# Patient Record
Sex: MALE | Race: BLACK OR AFRICAN AMERICAN | Employment: UNEMPLOYED | URBAN - METROPOLITAN AREA
[De-identification: names, ages, dates, MRNs, and addresses within clinical notes are randomized per-mention and may not be internally consistent; named-entity substitution may affect disease eponyms.]

---

## 2019-01-03 ENCOUNTER — HOSPITAL ENCOUNTER (EMERGENCY)
Age: 44
Discharge: HOME | End: 2019-01-03
Attending: EMERGENCY MEDICINE
Payer: MEDICAID

## 2019-01-03 VITALS
TEMPERATURE: 98.1 F | DIASTOLIC BLOOD PRESSURE: 92 MMHG | HEART RATE: 98 BPM | RESPIRATION RATE: 17 BRPM | SYSTOLIC BLOOD PRESSURE: 133 MMHG | OXYGEN SATURATION: 100 %

## 2019-01-03 DIAGNOSIS — J06.9 ACUTE UPPER RESPIRATORY INFECTION: Primary | ICD-10-CM

## 2019-01-03 PROCEDURE — 99282 EMERGENCY DEPT VISIT SF MDM: CPT

## 2019-01-03 RX ORDER — PSEUDOEPHEDRINE HYDROCHLORIDE 60 MG/1
60 TABLET ORAL
Qty: 20 TAB | Refills: 1 | Status: SHIPPED | OUTPATIENT
Start: 2019-01-03 | End: 2019-01-08

## 2019-01-03 NOTE — ED NOTES
Discharge instructions, prescriptions, and work note given to pt - verbalized understanding. Pt ambulated independently out of ED.

## 2019-01-03 NOTE — ED PROVIDER NOTES
EMERGENCY DEPARTMENT HISTORY AND PHYSICAL EXAM 
 
Date: 1/3/2019 Patient Name: Macie Contreras History of Presenting Illness Chief Complaint Patient presents with  Cough  Nausea  Chest Congestion History Provided By: Patient Chief Complaint: congestion, cough, nausea Duration: 3 Days Timing:  Acute Location: chest, sinuses Quality: Dull and Pressure Severity: Moderate Modifying Factors: smokes Associated Symptoms: denies any other associated signs or symptoms Additional History (Context): Macie Contreras is a 41 y.o. male with tobacco use who presents with chest and nasal congestion, cough x 3d. Was around family children over new years and they are ill. Denies fever, vomiting, wheezing, SOB. PCP: Jenny Wagoner MD (Inactive) Current Outpatient Medications Medication Sig Dispense Refill  pseudoephedrine (SUDAFED) 60 mg tablet Take 1 Tab by mouth every six (6) hours as needed for Congestion for up to 5 days. 20 Tab 1  
 Camphor-Eucalyptus Oil-Menthol (VICKS VAPORUB) 4.8-1.2-2.6 % oint 1 Actuation(s) by Apply Externally route three (3) times daily as needed. 50 g 0  
 dextromethorphan-guaiFENesin (ROBITUSSIN-DM)  mg/5 mL syrup Take 10 mL by mouth every six (6) hours as needed for Cough. 240 mL 0  
 albuterol (PROVENTIL HFA, VENTOLIN HFA, PROAIR HFA) 90 mcg/actuation inhaler Take 2 Puffs by inhalation every four (4) hours as needed for Wheezing. Past History Past Medical History: 
Past Medical History:  
Diagnosis Date  Glaucoma Past Surgical History: 
Past Surgical History:  
Procedure Laterality Date  HX HEENT    
 glaucoma left eye Family History: No family history on file. Social History: 
Social History Tobacco Use  Smoking status: Never Smoker Substance Use Topics  Alcohol use: Yes  Drug use: No  
 
 
Allergies: 
No Known Allergies Review of Systems Review of Systems HENT: Positive for congestion and postnasal drip. Negative for drooling, sore throat and trouble swallowing. Respiratory: Positive for cough. Negative for shortness of breath and wheezing. Gastrointestinal: Positive for nausea. All other systems reviewed and are negative. All Other Systems Negative Physical Exam  
 
Vitals:  
 01/03/19 1456 BP: (!) 133/92 Pulse: 98 Resp: 17 Temp: 98.1 °F (36.7 °C) SpO2: 100% Physical Exam  
Constitutional: Vital signs are normal. He appears well-developed and well-nourished. He is active. Non-toxic appearance. He does not appear ill. No distress. HENT:  
Head: Normocephalic and atraumatic. Neck: Normal range of motion. Neck supple. Carotid bruit is not present. No tracheal deviation present. No thyromegaly present. Cardiovascular: Normal rate, regular rhythm and normal heart sounds. Exam reveals no gallop and no friction rub. No murmur heard. Pulmonary/Chest: Effort normal and breath sounds normal. No stridor. No respiratory distress. He has no wheezes. He has no rales. He exhibits no tenderness. Abdominal: Soft. He exhibits no distension and no mass. There is no tenderness. There is no rebound, no guarding and no CVA tenderness. Musculoskeletal: Normal range of motion. Neurological: He is alert. Skin: Skin is warm, dry and intact. He is not diaphoretic. No pallor. Psychiatric: He has a normal mood and affect. His speech is normal and behavior is normal. Judgment and thought content normal.  
Nursing note and vitals reviewed. Diagnostic Study Results Labs - No results found for this or any previous visit (from the past 12 hour(s)). Radiologic Studies - No orders to display CT Results  (Last 48 hours) None CXR Results  (Last 48 hours) None Medical Decision Making I am the first provider for this patient.  
 
I reviewed the vital signs, available nursing notes, past medical history, past surgical history, family history and social history. Vital Signs-Reviewed the patient's vital signs. Records Reviewed: Nursing Notes Procedures: 
Procedures Provider Notes (Medical Decision Making): treat URI. MED RECONCILIATION: 
No current facility-administered medications for this encounter. Current Outpatient Medications Medication Sig  pseudoephedrine (SUDAFED) 60 mg tablet Take 1 Tab by mouth every six (6) hours as needed for Congestion for up to 5 days.  Camphor-Eucalyptus Oil-Menthol (VICKS VAPORUB) 4.8-1.2-2.6 % oint 1 Actuation(s) by Apply Externally route three (3) times daily as needed.  dextromethorphan-guaiFENesin (ROBITUSSIN-DM)  mg/5 mL syrup Take 10 mL by mouth every six (6) hours as needed for Cough.  albuterol (PROVENTIL HFA, VENTOLIN HFA, PROAIR HFA) 90 mcg/actuation inhaler Take 2 Puffs by inhalation every four (4) hours as needed for Wheezing. Disposition: 
home DISCHARGE NOTE:  
3:48 PM 
 
Pt has been reexamined. Patient has no new complaints, changes, or physical findings. Care plan outlined and precautions discussed. Results of exam were reviewed with the patient. All medications were reviewed with the patient; will d/c home with see below. All of pt's questions and concerns were addressed. Patient was instructed and agrees to follow up with PCP, as well as to return to the ED upon further deterioration. Patient is ready to go home. Follow-up Information Follow up With Specialties Details Why Contact Info Jenny Wagoner MD Internal Medicine Schedule an appointment as soon as possible for a visit in 3 days  [employer] [place] [address] 
[phone] [facility] EMERGENCY DEPT Emergency Medicine  If symptoms worsen return immediately [place] [address] Current Discharge Medication List  
  
START taking these medications Details pseudoephedrine (SUDAFED) 60 mg tablet Take 1 Tab by mouth every six (6) hours as needed for Congestion for up to 5 days. Qty: 20 Tab, Refills: 1 Camphor-Eucalyptus Oil-Menthol (VICKS VAPORUB) 4.8-1.2-2.6 % oint 1 Actuation(s) by Apply Externally route three (3) times daily as needed. Qty: 50 g, Refills: 0  
  
dextromethorphan-guaiFENesin (ROBITUSSIN-DM)  mg/5 mL syrup Take 10 mL by mouth every six (6) hours as needed for Cough. Qty: 240 mL, Refills: 0 Diagnosis Clinical Impression: 1. Acute upper respiratory infection

## 2019-01-03 NOTE — LETTER
[address] [place] [address] [facility] EMERGENCY DEPT 
[address] [facility] [address] 
[phone] Work/School Note Date: 1/3/2019 To Whom It May concern: 
 
Karli Raygoza was seen and treated today in the emergency room by the following provider(s): 
Attending Provider: Leonor Quintana MD 
Physician Assistant: SHANTELL Palacios. Karli Raygoza may return to work on 1-7-19.  
 
Sincerely, 
 
 
 
 
Savannah Ness RN

## 2019-01-03 NOTE — DISCHARGE INSTRUCTIONS
Patient Education        Upper Respiratory Infection (Cold): Care Instructions  Your Care Instructions    An upper respiratory infection, or URI, is an infection of the nose, sinuses, or throat. URIs are spread by coughs, sneezes, and direct contact. The common cold is the most frequent kind of URI. The flu and sinus infections are other kinds of URIs. Almost all URIs are caused by viruses. Antibiotics won't cure them. But you can treat most infections with home care. This may include drinking lots of fluids and taking over-the-counter pain medicine. You will probably feel better in 4 to 10 days. The doctor has checked you carefully, but problems can develop later. If you notice any problems or new symptoms, get medical treatment right away. Follow-up care is a key part of your treatment and safety. Be sure to make and go to all appointments, and call your doctor if you are having problems. It's also a good idea to know your test results and keep a list of the medicines you take. How can you care for yourself at home? · To prevent dehydration, drink plenty of fluids, enough so that your urine is light yellow or clear like water. Choose water and other caffeine-free clear liquids until you feel better. If you have kidney, heart, or liver disease and have to limit fluids, talk with your doctor before you increase the amount of fluids you drink. · Take an over-the-counter pain medicine, such as acetaminophen (Tylenol), ibuprofen (Advil, Motrin), or naproxen (Aleve). Read and follow all instructions on the label. · Before you use cough and cold medicines, check the label. These medicines may not be safe for young children or for people with certain health problems. · Be careful when taking over-the-counter cold or flu medicines and Tylenol at the same time. Many of these medicines have acetaminophen, which is Tylenol. Read the labels to make sure that you are not taking more than the recommended dose.  Too much acetaminophen (Tylenol) can be harmful. · Get plenty of rest.  · Do not smoke or allow others to smoke around you. If you need help quitting, talk to your doctor about stop-smoking programs and medicines. These can increase your chances of quitting for good. When should you call for help? Call 911 anytime you think you may need emergency care. For example, call if:    · You have severe trouble breathing.    Call your doctor now or seek immediate medical care if:    · You seem to be getting much sicker.     · You have new or worse trouble breathing.     · You have a new or higher fever.     · You have a new rash.    Watch closely for changes in your health, and be sure to contact your doctor if:    · You have a new symptom, such as a sore throat, an earache, or sinus pain.     · You cough more deeply or more often, especially if you notice more mucus or a change in the color of your mucus.     · You do not get better as expected. Where can you learn more? Go to http://sol-rashard.info/. Enter G083 in the search box to learn more about \"Upper Respiratory Infection (Cold): Care Instructions. \"  Current as of: December 6, 2017  Content Version: 11.8  © 2935-3313 [employer]. Care instructions adapted under license by [employer] (which disclaims liability or warranty for this information). If you have questions about a medical condition or this instruction, always ask your healthcare professional. [place] [address] any warranty or liability for your use of this information.

## 2019-01-03 NOTE — ED TRIAGE NOTES
Pt arrived c/o generally not feeling well, states he was around sick children, states he has had intermittent cough, chest congestion, also states he had intermittent nausea with no vomiting

## 2019-05-06 ENCOUNTER — APPOINTMENT (OUTPATIENT)
Dept: CT IMAGING | Age: 44
End: 2019-05-06
Attending: PHYSICIAN ASSISTANT
Payer: MEDICAID

## 2019-05-06 ENCOUNTER — HOSPITAL ENCOUNTER (EMERGENCY)
Age: 44
Discharge: HOME | End: 2019-05-06
Attending: EMERGENCY MEDICINE
Payer: MEDICAID

## 2019-05-06 VITALS
DIASTOLIC BLOOD PRESSURE: 85 MMHG | BODY MASS INDEX: 20.3 KG/M2 | SYSTOLIC BLOOD PRESSURE: 133 MMHG | TEMPERATURE: 99 F | OXYGEN SATURATION: 99 % | HEIGHT: 71 IN | HEART RATE: 87 BPM | WEIGHT: 145 LBS | RESPIRATION RATE: 18 BRPM
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
## 2019-05-06 DIAGNOSIS — R51.9 NONINTRACTABLE HEADACHE, UNSPECIFIED CHRONICITY PATTERN, UNSPECIFIED HEADACHE TYPE: ICD-10-CM

## 2019-05-06 DIAGNOSIS — M54.2 NECK PAIN: Primary | ICD-10-CM

## 2019-05-06 PROCEDURE — 99282 EMERGENCY DEPT VISIT SF MDM: CPT

## 2019-05-06 RX ORDER — IBUPROFEN 600 MG/1
600 TABLET ORAL
Qty: 20 TAB | Refills: 0 | Status: SHIPPED | OUTPATIENT
Start: 2019-05-06 | End: 2020-06-02

## 2019-05-06 RX ORDER — CYCLOBENZAPRINE HCL 5 MG
10 TABLET ORAL 3 TIMES DAILY
Qty: 18 TAB | Refills: 0 | Status: SHIPPED | OUTPATIENT
Start: 2019-05-06 | End: 2019-05-09
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
## 2019-07-18 ENCOUNTER — HOSPITAL ENCOUNTER (EMERGENCY)
Age: 44
Discharge: HOME | End: 2019-07-18
Attending: EMERGENCY MEDICINE
Payer: MEDICAID

## 2019-07-18 DIAGNOSIS — H00.012 HORDEOLUM EXTERNUM RIGHT LOWER EYELID: Primary | ICD-10-CM

## 2019-07-18 PROCEDURE — 99282 EMERGENCY DEPT VISIT SF MDM: CPT

## 2019-07-18 NOTE — DISCHARGE INSTRUCTIONS
Patient Education        Styes and Chalazia: Care Instructions  Your Care Instructions    Styes and chalazia (say \"[id number]\") are both conditions that can cause swelling of the eyelid. A stye is an infection in the root of an eyelash. The infection causes a tender red lump on the edge of the eyelid. The infection can spread until the whole eyelid becomes red and inflamed. Styes usually break open, and a tiny amount of pus drains. They usually clear up on their own in about a week, but they sometimes need treatment with antibiotics. A chalazion is a lump or cyst in the eyelid (chalazion is singular; chalazia is plural). It is caused by swelling and inflammation of deep oil glands inside the eyelid. Chalazia are usually not infected. They can take a few months to heal.  If a chalazion becomes more swollen and painful or does not go away, you may need to have it drained by your doctor. Follow-up care is a key part of your treatment and safety. Be sure to make and go to all appointments, and call your doctor if you are having problems. It's also a good idea to know your test results and keep a list of the medicines you take. How can you care for yourself at home? · Do not rub your eyes. Do not squeeze or try to open a stye or chalazion. · To help a stye or chalazion heal faster:  ? Put a warm, moist compress on your eye for 5 to 10 minutes, 3 to 6 times a day. Heat often brings a stye to a point where it drains on its own. Keep in mind that warm compresses will often increase swelling a little at first.  ? Do not use hot water or heat a wet cloth in a microwave oven. The compress may get too hot and can burn the eyelid. · Always wash your hands before and after you use a compress or touch your eyes. · If the doctor gave you antibiotic drops or ointment, use the medicine exactly as directed. Use the medicine for as long as instructed, even if your eye starts to feel better.   · To put in eyedrops or ointment:  ? Tilt your head back, and pull your lower eyelid down with one finger. ? Drop or squirt the medicine inside the lower lid. ? Close your eye for 30 to 60 seconds to let the drops or ointment move around. ? Do not touch the ointment or dropper tip to your eyelashes or any other surface. · Do not wear eye makeup or contact lenses until the stye or chalazion heals. · Do not share towels, pillows, or washcloths while you have a stye. When should you call for help? Call your doctor now or seek immediate medical care if:    · You have pain in your eye.     · You have a change in vision or loss of vision.     · Redness and swelling get much worse.    Watch closely for changes in your health, and be sure to contact your doctor if:    · Your stye does not get better in 1 week.     · Your chalazion does not start to get better after several weeks. Where can you learn more? Go to http://sol-rashard.info/. Enter E456 in the search box to learn more about \"Styes and Chalazia: Care Instructions. \"  Current as of: July 17, 2018  Content Version: 11.9  © 0703-6513 [employer]. Care instructions adapted under license by [employer] (which disclaims liability or warranty for this information). If you have questions about a medical condition or this instruction, always ask your healthcare professional. [place] [address] any warranty or liability for your use of this information.

## 2019-07-18 NOTE — ED PROVIDER NOTES
EMERGENCY DEPARTMENT HISTORY AND PHYSICAL EXAM      Date: 7/18/2019  Patient Name: Macho Mancini    History of Presenting Illness     Chief Complaint   Patient presents with    Eye Pain       History (Context): Macho Mancini is a 45 y.o. gentleman with glaucoma and history of styes who presents with acute onset, moderate, localized right lower lower eyelid swelling with mild tenderness without exacerbating/relieving features or other associated symptoms. On review of systems, the patient denies fever, chills, rashes, vision changes, foreign body sensation, conjunctival injection. PCP: Anuel Roberts MD (Inactive)    Current Outpatient Medications   Medication Sig Dispense Refill    ibuprofen (MOTRIN) 600 mg tablet Take 1 Tab by mouth every six (6) hours as needed for Pain. 20 Tab 0       Past History     Past Medical History:  Past Medical History:   Diagnosis Date    Glaucoma        Past Surgical History:  Past Surgical History:   Procedure Laterality Date    HX HEENT      glaucoma left eye       Family History:  History reviewed. No pertinent family history. Social History:  Social History     Tobacco Use    Smoking status: Current Every Day Smoker    Smokeless tobacco: Never Used   Substance Use Topics    Alcohol use: Yes    Drug use: No       Allergies:  No Known Allergies    PMH, PSH, family history, social history, allergies reviewed with the patient with significant items noted above. Review of Systems   As per HPI, otherwise reviewed and negative. Physical Exam   There were no vitals filed for this visit. Gen: Well-appearing, in no acute distress  HEENT: Normocephalic, sclera anicteric, small white Mass on the lower eyelid of the right eye, no rounding redness, mild surrounding swelling, no conjunctival injection, vision 20/20. Cardiovascular: Normal rate, regular rhythm, no murmurs, rubs, gallops. Pulses intact and equal distally. Pulmonary: No respiratory distress.   No stridor. Clear lungs. ABD: Soft, nontender, nondistended. Neuro: Alert. Normal speech. Normal mentation. Psych: Normal thought content and thought processes. : No CVA tenderness  EXT: Moves all extremities well. No cyanosis or clubbing. Skin: Warm and well-perfused. Diagnostic Study Results     Labs -   No results found for this or any previous visit (from the past 12 hour(s)). Radiologic Studies -   No orders to display     CT Results  (Last 48 hours)    None        CXR Results  (Last 48 hours)    None            Medical Decision Making   I am the first provider for this patient. I reviewed the vital signs, available nursing notes, past medical history, past surgical history, family history and social history. Vital Signs-Reviewed the patient's vital signs. Records Reviewed: Personally, on initial evaluation    MDM:   Patient presents with swelling of the lower eyelid. Exam significant for swelling of the lower. DDX considered: Hordeolum externa  DDX thought to be less likely but also considered due to high risk condition: Chalazion, cellulitis, angioedema    Patient condition on initial evaluation: Stable    Plan:   Discharge with instructions for warm compresses    Orders as below:  No orders of the defined types were placed in this encounter. Patient condition at time of disposition: Stable  DISCHARGE NOTE:    Care plan outlined and precautions discussed. All of pt's questions and concerns were addressed. Alarm symptoms and return precautions associated with chief complaint and evaluation were reviewed with the patient in detail. The patient demonstrated adequate understanding. Patient was instructed and agrees to follow up with PCP and ophthalmologist, as well as to return to the ED upon further deterioration. Patient is ready to go home.   The patient is very happy with this plan    Follow-up Information     Follow up With Specialties Details Why Contact Info Victoria Chávez MD Internal Medicine  As needed, If symptoms worsen [address] EMERGENCY DEPT Emergency Medicine  As needed, If symptoms worsen [address] [facility] [address]  [phone]              Diagnosis     Clinical Impression:   1. Hordeolum externum right lower eyelid        Signed,  Clinton Galicia MD  Emergency Physician  [facility]    As a voice dictation software was utilized to dictate this note, minor word transpositions can occur. I apologize for confusing wording and typographic errors. Please feel free to contact me for clarification.

## 2020-06-02 ENCOUNTER — APPOINTMENT (OUTPATIENT)
Dept: GENERAL RADIOLOGY | Age: 45
End: 2020-06-02
Attending: EMERGENCY MEDICINE
Payer: MEDICAID

## 2020-06-02 ENCOUNTER — HOSPITAL ENCOUNTER (EMERGENCY)
Age: 45
Discharge: HOME | End: 2020-06-03
Attending: EMERGENCY MEDICINE
Payer: MEDICAID

## 2020-06-02 DIAGNOSIS — E87.6 HYPOKALEMIA: ICD-10-CM

## 2020-06-02 DIAGNOSIS — R74.8 ELEVATED CK: ICD-10-CM

## 2020-06-02 DIAGNOSIS — S32.10XA CLOSED FRACTURE OF SACRUM AND COCCYX, INITIAL ENCOUNTER (HCC): ICD-10-CM

## 2020-06-02 DIAGNOSIS — S32.2XXA CLOSED FRACTURE OF SACRUM AND COCCYX, INITIAL ENCOUNTER (HCC): ICD-10-CM

## 2020-06-02 DIAGNOSIS — E86.0 DEHYDRATION: Primary | ICD-10-CM

## 2020-06-02 LAB
ALBUMIN SERPL-MCNC: 4 G/DL (ref 3.4–5)
ALBUMIN/GLOB SERPL: 1.1 {RATIO} (ref 0.8–1.7)
ALP SERPL-CCNC: 171 U/L (ref 45–117)
ALT SERPL-CCNC: 271 U/L (ref 16–61)
ANION GAP SERPL CALC-SCNC: 8 MMOL/L (ref 3–18)
AST SERPL-CCNC: 470 U/L (ref 10–38)
BASOPHILS # BLD: 0 K/UL (ref 0–0.1)
BASOPHILS NFR BLD: 1 % (ref 0–2)
BILIRUB SERPL-MCNC: 0.9 MG/DL (ref 0.2–1)
BUN SERPL-MCNC: 8 MG/DL (ref 7–18)
BUN/CREAT SERPL: 11 (ref 12–20)
CALCIUM SERPL-MCNC: 8.7 MG/DL (ref 8.5–10.1)
CHLORIDE SERPL-SCNC: 99 MMOL/L (ref 100–111)
CK SERPL-CCNC: 594 U/L (ref 39–308)
CO2 SERPL-SCNC: 26 MMOL/L (ref 21–32)
CREAT SERPL-MCNC: 0.71 MG/DL (ref 0.6–1.3)
DIFFERENTIAL METHOD BLD: ABNORMAL
EOSINOPHIL # BLD: 0 K/UL (ref 0–0.4)
EOSINOPHIL NFR BLD: 0 % (ref 0–5)
ERYTHROCYTE [DISTWIDTH] IN BLOOD BY AUTOMATED COUNT: 12.2 % (ref 11.6–14.5)
GLOBULIN SER CALC-MCNC: 3.6 G/DL (ref 2–4)
GLUCOSE SERPL-MCNC: 87 MG/DL (ref 74–99)
HCT VFR BLD AUTO: 33.1 % (ref 36–48)
HGB BLD-MCNC: 11.6 G/DL (ref 13–16)
LYMPHOCYTES # BLD: 0.4 K/UL (ref 0.9–3.6)
LYMPHOCYTES NFR BLD: 15 % (ref 21–52)
MCH RBC QN AUTO: 34.6 PG (ref 24–34)
MCHC RBC AUTO-ENTMCNC: 35 G/DL (ref 31–37)
MCV RBC AUTO: 98.8 FL (ref 74–97)
MONOCYTES # BLD: 0.4 K/UL (ref 0.05–1.2)
MONOCYTES NFR BLD: 16 % (ref 3–10)
NEUTS SEG # BLD: 2 K/UL (ref 1.8–8)
NEUTS SEG NFR BLD: 68 % (ref 40–73)
PLATELET # BLD AUTO: 43 K/UL (ref 135–420)
PLATELET COMMENTS,PCOM: ABNORMAL
PMV BLD AUTO: 11.7 FL (ref 9.2–11.8)
POTASSIUM SERPL-SCNC: 2.9 MMOL/L (ref 3.5–5.5)
PROT SERPL-MCNC: 7.6 G/DL (ref 6.4–8.2)
RBC # BLD AUTO: 3.35 M/UL (ref 4.7–5.5)
RBC MORPH BLD: ABNORMAL
SODIUM SERPL-SCNC: 133 MMOL/L (ref 136–145)
WBC # BLD AUTO: 2.8 K/UL (ref 4.6–13.2)

## 2020-06-02 PROCEDURE — 74011250637 HC RX REV CODE- 250/637: Performed by: EMERGENCY MEDICINE

## 2020-06-02 PROCEDURE — 74011250636 HC RX REV CODE- 250/636: Performed by: EMERGENCY MEDICINE

## 2020-06-02 PROCEDURE — 96361 HYDRATE IV INFUSION ADD-ON: CPT

## 2020-06-02 PROCEDURE — 96365 THER/PROPH/DIAG IV INF INIT: CPT

## 2020-06-02 PROCEDURE — 85025 COMPLETE CBC W/AUTO DIFF WBC: CPT

## 2020-06-02 PROCEDURE — 99284 EMERGENCY DEPT VISIT MOD MDM: CPT

## 2020-06-02 PROCEDURE — 82550 ASSAY OF CK (CPK): CPT

## 2020-06-02 PROCEDURE — 80053 COMPREHEN METABOLIC PANEL: CPT

## 2020-06-02 PROCEDURE — 72220 X-RAY EXAM SACRUM TAILBONE: CPT

## 2020-06-02 PROCEDURE — 96375 TX/PRO/DX INJ NEW DRUG ADDON: CPT

## 2020-06-02 RX ORDER — POTASSIUM CHLORIDE 7.45 MG/ML
10 INJECTION INTRAVENOUS
Status: COMPLETED | OUTPATIENT
Start: 2020-06-02 | End: 2020-06-03

## 2020-06-02 RX ORDER — POTASSIUM CHLORIDE 20 MEQ/1
40 TABLET, EXTENDED RELEASE ORAL
Status: COMPLETED | OUTPATIENT
Start: 2020-06-02 | End: 2020-06-02

## 2020-06-02 RX ADMIN — POTASSIUM CHLORIDE 10 MEQ: 10 INJECTION, SOLUTION INTRAVENOUS at 23:31

## 2020-06-02 RX ADMIN — POTASSIUM CHLORIDE 40 MEQ: 1500 TABLET, EXTENDED RELEASE ORAL at 23:31

## 2020-06-02 RX ADMIN — SODIUM CHLORIDE 1000 ML: 900 INJECTION, SOLUTION INTRAVENOUS at 22:41

## 2020-06-03 VITALS
SYSTOLIC BLOOD PRESSURE: 156 MMHG | RESPIRATION RATE: 12 BRPM | DIASTOLIC BLOOD PRESSURE: 95 MMHG | OXYGEN SATURATION: 99 % | HEART RATE: 67 BPM | TEMPERATURE: 99.2 F

## 2020-06-03 PROCEDURE — 74011250636 HC RX REV CODE- 250/636: Performed by: EMERGENCY MEDICINE

## 2020-06-03 RX ORDER — ONDANSETRON 2 MG/ML
4 INJECTION INTRAMUSCULAR; INTRAVENOUS
Status: COMPLETED | OUTPATIENT
Start: 2020-06-03 | End: 2020-06-03

## 2020-06-03 RX ORDER — IBUPROFEN 600 MG/1
600 TABLET ORAL
Qty: 20 TAB | Refills: 0 | Status: SHIPPED | OUTPATIENT
Start: 2020-06-03

## 2020-06-03 RX ORDER — POTASSIUM CHLORIDE 20 MEQ/1
20 TABLET, EXTENDED RELEASE ORAL DAILY
Qty: 10 TAB | Refills: 0 | Status: SHIPPED | OUTPATIENT
Start: 2020-06-03 | End: 2020-06-13

## 2020-06-03 RX ADMIN — ONDANSETRON 4 MG: 2 INJECTION INTRAMUSCULAR; INTRAVENOUS at 01:52

## 2020-06-03 RX ADMIN — SODIUM CHLORIDE 1000 ML: 900 INJECTION, SOLUTION INTRAVENOUS at 00:58

## 2020-06-03 NOTE — ED PROVIDER NOTES
HPI Patient presents to ER for C/O generalized muscle cramping and, malaise x 1 days. He also  states he slipped and fell a couple weeks ago on wet ground landing on his tailbone. \"My tailbone hurts\". Past Medical History:   Diagnosis Date    Glaucoma        Past Surgical History:   Procedure Laterality Date    HX HEENT      glaucoma left eye         No family history on file. Social History     Socioeconomic History    Marital status: SINGLE     Spouse name: Not on file    Number of children: Not on file    Years of education: Not on file    Highest education level: Not on file   Occupational History    Not on file   Social Needs    Financial resource strain: Not on file    Food insecurity     Worry: Not on file     Inability: Not on file    Transportation needs     Medical: Not on file     Non-medical: Not on file   Tobacco Use    Smoking status: Current Every Day Smoker    Smokeless tobacco: Never Used   Substance and Sexual Activity    Alcohol use: Yes    Drug use: No    Sexual activity: Yes     Partners: Female   Lifestyle    Physical activity     Days per week: Not on file     Minutes per session: Not on file    Stress: Not on file   Relationships    Social connections     Talks on phone: Not on file     Gets together: Not on file     Attends Gnosticism service: Not on file     Active member of club or organization: Not on file     Attends meetings of clubs or organizations: Not on file     Relationship status: Not on file    Intimate partner violence     Fear of current or ex partner: Not on file     Emotionally abused: Not on file     Physically abused: Not on file     Forced sexual activity: Not on file   Other Topics Concern    Not on file   Social History Narrative    Not on file         ALLERGIES: Patient has no known allergies. Review of Systems   Constitutional: Negative. HENT: Negative. Eyes: Negative. Respiratory: Negative. Cardiovascular: Negative. Gastrointestinal: Negative. Endocrine: Negative. Genitourinary: Negative. Musculoskeletal: Negative. Skin: Negative. Allergic/Immunologic: Negative. Neurological: Negative. Hematological: Negative. Psychiatric/Behavioral: Negative. All other systems reviewed and are negative. Vitals:    06/02/20 2220   BP: (!) 144/100   Pulse: 85   Resp: 16   Temp: 99.7 °F (37.6 °C)   SpO2: 98%            Physical Exam  Vitals signs and nursing note reviewed. Constitutional:       General: He is not in acute distress. Appearance: He is well-developed. HENT:      Head: Normocephalic. Eyes:      Conjunctiva/sclera: Conjunctivae normal.      Pupils: Pupils are equal, round, and reactive to light. Neck:      Musculoskeletal: Normal range of motion and neck supple. Cardiovascular:      Rate and Rhythm: Normal rate and regular rhythm. Heart sounds: Normal heart sounds. No murmur. Pulmonary:      Effort: Pulmonary effort is normal. No respiratory distress. Breath sounds: Normal breath sounds. No wheezing or rales. Chest:      Chest wall: No tenderness. Abdominal:      General: Bowel sounds are normal. There is no distension. Palpations: Abdomen is soft. Tenderness: There is no abdominal tenderness. There is no rebound. Musculoskeletal: Normal range of motion. General: No tenderness. Skin:     General: Skin is warm and dry. Findings: No rash. Neurological:      Mental Status: He is alert and oriented to person, place, and time. Cranial Nerves: No cranial nerve deficit. Motor: No abnormal muscle tone. Coordination: Coordination normal.   Psychiatric:         Behavior: Behavior normal.         Thought Content:  Thought content normal.         Judgment: Judgment normal.          MDM  Number of Diagnoses or Management Options  Risk of Complications, Morbidity, and/or Mortality  Presenting problems: low  Diagnostic procedures: moderate  Management options: low           Procedures      Hospital course: Patient was treated with IV hydration and IV Toradol. Patient symptom resolved. Diagnosis: Dehydration, fracture coccyx, elevated CK, early rhabdo    Disposition: DC home, patient drink plenty of liquids, follow-up PCP in 24 hours. Motrin every 6 hours as needed pain    Dictation disclaimer:  Please note that this dictation was completed with [employer], the computer voice recognition software. Quite often unanticipated grammatical, syntax, homophones, and other interpretive errors are inadvertently transcribed by the computer software. Please disregard these errors. Please excuse any errors that have escaped final proofreading.

## 2020-06-03 NOTE — DISCHARGE INSTRUCTIONS
Patient Education        Tailbone Injury: Care Instructions  Your Care Instructions     Injuries to the tailbone (coccyx) can occur when you slip or fall and hit your tailbone. A tailbone injury causes pain when you sit, especially when you slump or sit on a hard seat. Straining to have a bowel movement can also be very painful. Tailbone injuries can take several months to heal, but home treatment can ease the pain. Follow-up care is a key part of your treatment and safety. Be sure to make and go to all appointments, and call your doctor if you are having problems. It's also a good idea to know your test results and keep a list of the medicines you take. How can you care for yourself at home? · Take pain medicines exactly as directed. ? If the doctor gave you a prescription medicine for pain, take it as prescribed. ? If you are not taking a prescription pain medicine, ask your doctor if you can take an over-the-counter medicine to reduce pain and swelling. Read and follow all instructions on the label. · Put ice or a cold pack on your tailbone for 10 to 20 minutes at a time. Try to do this every 1 to 2 hours for the next 3 days (when you are awake) or until the swelling goes down. Put a thin cloth between the ice and your skin. · You can switch between using ice and heat 2 to 3 days after the injury. Take a warm bath for 20 minutes, 3 or 4 times a day. You can use a doughnut-shaped pillow or towel in the tub to pad the hard tub surface. · Do not sit on hard, unpadded surfaces. Sit on a doughnut-shaped pillow to take pressure off the tailbone area. · Avoid constipation, because straining to have a bowel movement will increase your tailbone pain. ? Include fruits, vegetables, beans, and whole grains in your diet each day. These foods are high in fiber. ? Drink plenty of fluids, enough so that your urine is light yellow or clear like water.  If you have kidney, heart, or liver disease and have to limit fluids, talk with your doctor before you increase your fluid intake. ? Get some exercise every day. Build up slowly to 30 to 60 minutes a day on 5 or more days of the week. ? Take a fiber supplement, such as Citrucel or Metamucil, every day if needed. Read and follow all instructions on the label. ? Schedule time each day for a bowel movement. A daily routine may help. Take your time and do not strain when having your bowel movement. When should you call for help? Call your doctor now or seek immediate medical care if you have new or worse symptoms in your legs or buttocks. Symptoms may include:  · Numbness or tingling. · Weakness. · Pain. Watch closely for changes in your health, and be sure to contact your doctor if:  · You do not get better as expected. Where can you learn more? Go to http://sol-rashard.info/  Enter P195 in the search box to learn more about \"Tailbone Injury: Care Instructions. \"  Current as of: March 2, 2020               Content Version: 12.5  © 5555-8825 [email or web address]. Care instructions adapted under license by [employer] (which disclaims liability or warranty for this information). If you have questions about a medical condition or this instruction, always ask your healthcare professional. [address] any warranty or liability for your use of this information. Patient Education        Dehydration: Care Instructions  Your Care Instructions  Dehydration happens when your body loses too much fluid. This might happen when you do not drink enough water or you lose large amounts of fluids from your body because of diarrhea, vomiting, or sweating. Severe dehydration can be life-threatening. Water and minerals called electrolytes help put your body fluids back in balance. Learn the early signs of fluid loss, and drink more fluids to prevent dehydration. Follow-up care is a key part of your treatment and safety.  Be sure to make and go to all appointments, and call your doctor if you are having problems. It's also a good idea to know your test results and keep a list of the medicines you take. How can you care for yourself at home? · To prevent dehydration, drink plenty of fluids, enough so that your urine is light yellow or clear like water. Choose water and other caffeine-free clear liquids until you feel better. If you have kidney, heart, or liver disease and have to limit fluids, talk with your doctor before you increase the amount of fluids you drink. · If you do not feel like eating or drinking, try taking small sips of water, sports drinks, or other rehydration drinks. · Get plenty of rest.  To prevent dehydration  · Add more fluids to your diet and daily routine, unless your doctor has told you not to. · During hot weather, drink more fluids. Drink even more fluids if you exercise a lot. Stay away from drinks with alcohol or caffeine. · Watch for the symptoms of dehydration. These include:  ? A dry, sticky mouth. ? Dark yellow urine, and not much of it. ? Dry and sunken eyes. ? Feeling very tired. · Learn what problems can lead to dehydration. These include:  ? Diarrhea, fever, and vomiting. ? Any illness with a fever, such as pneumonia or the flu. ? Activities that cause heavy sweating, such as endurance races and heavy outdoor work in hot or humid weather. ? Alcohol or drug use or problems caused by quitting their use (withdrawal). ? Certain medicines, such as cold and allergy pills (antihistamines), diet pills (diuretics), and laxatives. ? Certain diseases, such as diabetes, cancer, and heart or kidney disease. When should you call for help? [id number] anytime you think you may need emergency care. For example, call if:  · You passed out (lost consciousness). Call your doctor now or seek immediate medical care if:  · You are confused and cannot think clearly.   · You are dizzy or lightheaded, or you feel like you may faint. · You have signs of needing more fluids. You have sunken eyes and a dry mouth, and you pass only a little dark urine. · You cannot keep fluids down. Watch closely for changes in your health, and be sure to contact your doctor if:  · You are not making tears. · Your skin is very dry and sags slowly back into place after you pinch it. · Your mouth and eyes are very dry. Where can you learn more? Go to http://sol-rashard.info/  Enter Q814 in the search box to learn more about \"Dehydration: Care Instructions. \"  Current as of: June 26, 2019               Content Version: 12.5  © 5127-8185 [email or web address]. Care instructions adapted under license by [employer] (which disclaims liability or warranty for this information). If you have questions about a medical condition or this instruction, always ask your healthcare professional. [place] [address] any warranty or liability for your use of this information. Patient Education        Oral Rehydration: Care Instructions  Your Care Instructions     Dehydration occurs when your body loses too much water. This can happen if you do not drink enough fluids or lose a lot of fluid due to diarrhea, vomiting, or sweating. Being dehydrated can cause health problems and can even be life-threatening. To replace lost fluids, you need to drink liquid that contains special chemicals called electrolytes. Electrolytes keep your body working well. Plain water does not have electrolytes. You also need to rest to prevent more fluid loss. Replacing water and electrolytes (oral rehydration) completely takes about 36 hours. But you should feel better within a few hours. Follow-up care is a key part of your treatment and safety. Be sure to make and go to all appointments, and call your doctor if you are having problems.  It's also a good idea to know your test results and keep a list of the medicines you take. How can you care for yourself at home? · Take frequent sips of a drink such as Gatorade, Powerade, or other rehydration drinks that your doctor suggests. These replace both fluid and important chemicals (electrolytes) you need for balance in your blood. · Drink 2 quarts of cool liquid over 2 to 4 hours. You should have at least 10 glasses of liquid a day to replace lost fluid. If you have kidney, heart, or liver disease and have to limit fluids, talk with your doctor before you increase the amount of fluids you drink. · Make your own drink. Measure everything carefully. The drink may not work well or may even be harmful if the amounts are off. ? 1 quart water  ? ½ teaspoon salt  ? 6 teaspoons sugar  · Do not drink liquid with caffeine, such as coffee and tariq. · Do not drink any alcohol. It can make you dehydrated. · Drink plenty of fluids, enough so that your urine is light yellow or clear like water. If you have kidney, heart, or liver disease and have to limit fluids, talk with your doctor before you increase the amount of fluids you drink. When should you call for help? [id number] anytime you think you may need emergency care. For example, call if:  · You have signs of severe dehydration, such as:  ? You are confused or unable to stay awake.  ? You passed out (lost consciousness). Call your doctor now or seek immediate medical care if:  · You still have signs of dehydration. You have sunken eyes and a dry mouth, and you pass only a little dark urine. · You are dizzy or lightheaded, or you feel like you may faint. · You are not able to keep down fluids. Watch closely for changes in your health, and be sure to contact your doctor if:  · You do not get better as expected. Where can you learn more? Go to http://sol-rashard.info/  Enter I040 in the search box to learn more about \"Oral Rehydration: Care Instructions. \"  Current as of: June 26, 2019               Content Version: 12.5  © 5303-1290 [employer]. Care instructions adapted under license by [employer] (which disclaims liability or warranty for this information). If you have questions about a medical condition or this instruction, always ask your healthcare professional. [address] any warranty or liability for your use of this information.

## 2020-06-03 NOTE — ED TRIAGE NOTES
Patient presents to ER for C/O generalized muscle cramping, states he slipped and fell a couple weeks ago on wet ground \"My tailbone hurts\".

## 2021-02-10 ENCOUNTER — TRANSCRIBE ORDER (OUTPATIENT)
Dept: SCHEDULING | Age: 46
End: 2021-02-10

## 2021-02-10 DIAGNOSIS — R79.89 ABNORMAL LFTS: Primary | ICD-10-CM

## 2021-02-11 ENCOUNTER — HOSPITAL ENCOUNTER (OUTPATIENT)
Dept: LAB | Age: 46
Discharge: HOME | End: 2021-02-11

## 2021-02-11 LAB — XX-LABCORP SPECIMEN COL,LCBCF: NORMAL

## 2021-02-11 PROCEDURE — 99001 SPECIMEN HANDLING PT-LAB: CPT

## 2021-03-10 ENCOUNTER — HOSPITAL ENCOUNTER (OUTPATIENT)
Dept: ULTRASOUND IMAGING | Age: 46
Discharge: HOME | End: 2021-03-10
Attending: NURSE PRACTITIONER
Payer: MEDICAID

## 2021-03-10 DIAGNOSIS — R79.89 ABNORMAL LFTS: ICD-10-CM

## 2021-03-10 PROCEDURE — 76705 ECHO EXAM OF ABDOMEN: CPT

## 2021-06-22 ENCOUNTER — HOSPITAL ENCOUNTER (EMERGENCY)
Age: 46
Discharge: HOME | End: 2021-06-22
Attending: EMERGENCY MEDICINE
Payer: MEDICAID

## 2021-06-22 VITALS
HEART RATE: 72 BPM | DIASTOLIC BLOOD PRESSURE: 89 MMHG | RESPIRATION RATE: 16 BRPM | SYSTOLIC BLOOD PRESSURE: 185 MMHG | OXYGEN SATURATION: 100 % | BODY MASS INDEX: 17.85 KG/M2 | WEIGHT: 128 LBS | TEMPERATURE: 99.1 F

## 2021-06-22 DIAGNOSIS — F10.920 ALCOHOLIC INTOXICATION WITHOUT COMPLICATION (HCC): Primary | ICD-10-CM

## 2021-06-22 DIAGNOSIS — R74.01 TRANSAMINITIS: ICD-10-CM

## 2021-06-22 LAB
ALBUMIN SERPL-MCNC: 3.7 G/DL (ref 3.4–5)
ALBUMIN/GLOB SERPL: 0.9 {RATIO} (ref 0.8–1.7)
ALP SERPL-CCNC: 223 U/L (ref 45–117)
ALT SERPL-CCNC: 141 U/L (ref 16–61)
ANION GAP SERPL CALC-SCNC: 14 MMOL/L (ref 3–18)
APTT PPP: 30.3 SEC (ref 23–36.4)
AST SERPL-CCNC: 312 U/L (ref 10–38)
BASOPHILS # BLD: 0.1 K/UL (ref 0–0.1)
BASOPHILS NFR BLD: 2 % (ref 0–2)
BILIRUB SERPL-MCNC: 1.2 MG/DL (ref 0.2–1)
BUN SERPL-MCNC: 12 MG/DL (ref 7–18)
BUN/CREAT SERPL: 18 (ref 12–20)
CALCIUM SERPL-MCNC: 8.6 MG/DL (ref 8.5–10.1)
CHLORIDE SERPL-SCNC: 101 MMOL/L (ref 100–111)
CO2 SERPL-SCNC: 25 MMOL/L (ref 21–32)
CREAT SERPL-MCNC: 0.66 MG/DL (ref 0.6–1.3)
DIFFERENTIAL METHOD BLD: ABNORMAL
EOSINOPHIL # BLD: 0 K/UL (ref 0–0.4)
EOSINOPHIL NFR BLD: 0 % (ref 0–5)
ERYTHROCYTE [DISTWIDTH] IN BLOOD BY AUTOMATED COUNT: 13.6 % (ref 11.6–14.5)
ETHANOL SERPL-MCNC: 60 MG/DL (ref 0–3)
GLOBULIN SER CALC-MCNC: 4.2 G/DL (ref 2–4)
GLUCOSE SERPL-MCNC: 93 MG/DL (ref 74–99)
HCT VFR BLD AUTO: 28.8 % (ref 36–48)
HGB BLD-MCNC: 10.4 G/DL (ref 13–16)
INR PPP: 1.2 (ref 0.8–1.2)
LIPASE SERPL-CCNC: 250 U/L (ref 73–393)
LYMPHOCYTES # BLD: 0.6 K/UL (ref 0.9–3.6)
LYMPHOCYTES NFR BLD: 19 % (ref 21–52)
MCH RBC QN AUTO: 36.5 PG (ref 24–34)
MCHC RBC AUTO-ENTMCNC: 36.1 G/DL (ref 31–37)
MCV RBC AUTO: 101.1 FL (ref 74–97)
MONOCYTES # BLD: 0.4 K/UL (ref 0.05–1.2)
MONOCYTES NFR BLD: 14 % (ref 3–10)
NEUTS SEG # BLD: 2 K/UL (ref 1.8–8)
NEUTS SEG NFR BLD: 65 % (ref 40–73)
PLATELET # BLD AUTO: 45 K/UL (ref 135–420)
PLATELET COMMENTS,PCOM: ABNORMAL
PMV BLD AUTO: 14 FL (ref 9.2–11.8)
POTASSIUM SERPL-SCNC: 3.4 MMOL/L (ref 3.5–5.5)
PROT SERPL-MCNC: 7.9 G/DL (ref 6.4–8.2)
PROTHROMBIN TIME: 15.2 SEC (ref 11.5–15.2)
RBC # BLD AUTO: 2.85 M/UL (ref 4.35–5.65)
RBC MORPH BLD: ABNORMAL
SODIUM SERPL-SCNC: 140 MMOL/L (ref 136–145)
WBC # BLD AUTO: 3.1 K/UL (ref 4.6–13.2)

## 2021-06-22 PROCEDURE — 99284 EMERGENCY DEPT VISIT MOD MDM: CPT

## 2021-06-22 PROCEDURE — 96361 HYDRATE IV INFUSION ADD-ON: CPT

## 2021-06-22 PROCEDURE — 74011250636 HC RX REV CODE- 250/636: Performed by: PHYSICIAN ASSISTANT

## 2021-06-22 PROCEDURE — C9113 INJ PANTOPRAZOLE SODIUM, VIA: HCPCS | Performed by: PHYSICIAN ASSISTANT

## 2021-06-22 PROCEDURE — 80053 COMPREHEN METABOLIC PANEL: CPT

## 2021-06-22 PROCEDURE — 85025 COMPLETE CBC W/AUTO DIFF WBC: CPT

## 2021-06-22 PROCEDURE — 74011000250 HC RX REV CODE- 250: Performed by: PHYSICIAN ASSISTANT

## 2021-06-22 PROCEDURE — 85730 THROMBOPLASTIN TIME PARTIAL: CPT

## 2021-06-22 PROCEDURE — 83690 ASSAY OF LIPASE: CPT

## 2021-06-22 PROCEDURE — 96375 TX/PRO/DX INJ NEW DRUG ADDON: CPT

## 2021-06-22 PROCEDURE — 82077 ASSAY SPEC XCP UR&BREATH IA: CPT

## 2021-06-22 PROCEDURE — 96374 THER/PROPH/DIAG INJ IV PUSH: CPT

## 2021-06-22 PROCEDURE — 85610 PROTHROMBIN TIME: CPT

## 2021-06-22 PROCEDURE — 96376 TX/PRO/DX INJ SAME DRUG ADON: CPT

## 2021-06-22 RX ORDER — LORAZEPAM 1 MG/1
1 TABLET ORAL
Status: DISCONTINUED | OUTPATIENT
Start: 2021-06-22 | End: 2021-06-22 | Stop reason: HOSPADM

## 2021-06-22 RX ORDER — LORAZEPAM 2 MG/ML
1 INJECTION INTRAMUSCULAR
Status: COMPLETED | OUTPATIENT
Start: 2021-06-22 | End: 2021-06-22

## 2021-06-22 RX ORDER — LORAZEPAM 2 MG/ML
2 INJECTION INTRAMUSCULAR
Status: DISCONTINUED | OUTPATIENT
Start: 2021-06-22 | End: 2021-06-22 | Stop reason: HOSPADM

## 2021-06-22 RX ORDER — SODIUM CHLORIDE 0.9 % (FLUSH) 0.9 %
5-40 SYRINGE (ML) INJECTION AS NEEDED
Status: DISCONTINUED | OUTPATIENT
Start: 2021-06-22 | End: 2021-06-22 | Stop reason: HOSPADM

## 2021-06-22 RX ORDER — KETOROLAC TROMETHAMINE 15 MG/ML
15 INJECTION, SOLUTION INTRAMUSCULAR; INTRAVENOUS
Status: COMPLETED | OUTPATIENT
Start: 2021-06-22 | End: 2021-06-22

## 2021-06-22 RX ORDER — LORAZEPAM 2 MG/ML
3 INJECTION INTRAMUSCULAR
Status: DISCONTINUED | OUTPATIENT
Start: 2021-06-22 | End: 2021-06-22 | Stop reason: HOSPADM

## 2021-06-22 RX ORDER — ONDANSETRON 2 MG/ML
4 INJECTION INTRAMUSCULAR; INTRAVENOUS
Status: COMPLETED | OUTPATIENT
Start: 2021-06-22 | End: 2021-06-22

## 2021-06-22 RX ORDER — SODIUM CHLORIDE 0.9 % (FLUSH) 0.9 %
5-40 SYRINGE (ML) INJECTION EVERY 8 HOURS
Status: DISCONTINUED | OUTPATIENT
Start: 2021-06-22 | End: 2021-06-22 | Stop reason: HOSPADM

## 2021-06-22 RX ORDER — LORAZEPAM 2 MG/ML
1 INJECTION INTRAMUSCULAR
Status: DISCONTINUED | OUTPATIENT
Start: 2021-06-22 | End: 2021-06-22 | Stop reason: HOSPADM

## 2021-06-22 RX ORDER — LORAZEPAM 1 MG/1
2 TABLET ORAL
Status: DISCONTINUED | OUTPATIENT
Start: 2021-06-22 | End: 2021-06-22 | Stop reason: HOSPADM

## 2021-06-22 RX ORDER — ONDANSETRON 4 MG/1
4 TABLET, ORALLY DISINTEGRATING ORAL
Qty: 20 TABLET | Refills: 0 | Status: SHIPPED | OUTPATIENT
Start: 2021-06-22

## 2021-06-22 RX ADMIN — ONDANSETRON 4 MG: 2 INJECTION INTRAMUSCULAR; INTRAVENOUS at 19:46

## 2021-06-22 RX ADMIN — PANTOPRAZOLE SODIUM 40 MG: 40 INJECTION, POWDER, FOR SOLUTION INTRAVENOUS at 17:22

## 2021-06-22 RX ADMIN — SODIUM CHLORIDE 1000 ML: 900 INJECTION, SOLUTION INTRAVENOUS at 17:22

## 2021-06-22 RX ADMIN — LORAZEPAM 1 MG: 2 INJECTION INTRAMUSCULAR; INTRAVENOUS at 17:29

## 2021-06-22 RX ADMIN — KETOROLAC TROMETHAMINE 15 MG: 15 INJECTION, SOLUTION INTRAMUSCULAR; INTRAVENOUS at 19:48

## 2021-06-22 RX ADMIN — LORAZEPAM 1 MG: 2 INJECTION INTRAMUSCULAR; INTRAVENOUS at 20:31

## 2021-06-22 RX ADMIN — Medication 10 ML: at 17:23

## 2021-06-22 NOTE — ED TRIAGE NOTES
Pt with frequent episodes of vomiting with onset today. Pt was advised to go to the ED for iV fluids. Pt is an alcoholic and is currently in withdrawal. Pts last drink was today around 11 AM. Pt plans to go to detox tomorrow.

## 2021-06-22 NOTE — ED PROVIDER NOTES
EMERGENCY DEPARTMENT HISTORY AND PHYSICAL EXAM      Date: 6/22/2021  Patient Name: Benigno Morris    History of Presenting Illness     Chief Complaint   Patient presents with    Vomiting    Alcohol intoxication       History Provided By: Patient    HPI: Benigno Morris, 42 y.o. male PMHx significant for glaucoma presents ambulatory to the ED. Pt reports drinking alcohol regularly over the past few years. Pt reports he is scheduled to start outpatient alcohol detox tomorrow morning. Pt reports he stopped drinking yesterday, but drank an airplane bottle around 1100 this morning to ease the \"shakes\". Patient denies history of DTs. Patient also reports multiple episodes of nonbloody emesis that began this morning. Denies abdominal pain, diarrhea, fever/chills. Pt has not taken anything for sx. There are no other complaints, changes, or physical findings at this time. PCP: Brianna Tang NP    No current facility-administered medications on file prior to encounter. Current Outpatient Medications on File Prior to Encounter   Medication Sig Dispense Refill    ibuprofen (MOTRIN) 600 mg tablet Take 1 Tab by mouth every six (6) hours as needed for Pain. 20 Tab 0       Past History     Past Medical History:  Past Medical History:   Diagnosis Date    Glaucoma        Past Surgical History:  Past Surgical History:   Procedure Laterality Date    HX HEENT      glaucoma left eye       Family History:  No family history on file. Social History:  Social History     Tobacco Use    Smoking status: Current Every Day Smoker    Smokeless tobacco: Never Used   Substance Use Topics    Alcohol use: Yes    Drug use: No       Allergies:  No Known Allergies      Review of Systems   Review of Systems   Constitutional: Negative for chills and fever. HENT: Negative for facial swelling. Eyes: Negative for photophobia and visual disturbance. Respiratory: Negative for shortness of breath.     Cardiovascular: Negative for chest pain. Gastrointestinal: Positive for vomiting. Negative for abdominal pain and nausea. Genitourinary: Negative for flank pain. Skin: Negative for color change, pallor, rash and wound. Neurological: Negative for dizziness, weakness, light-headedness and headaches. All other systems reviewed and are negative. Physical Exam   Physical Exam  Vitals and nursing note reviewed. Constitutional:       General: He is not in acute distress. Appearance: He is well-developed. Comments: Pt in NAD   HENT:      Head: Normocephalic and atraumatic. Eyes:      Conjunctiva/sclera: Conjunctivae normal.   Cardiovascular:      Rate and Rhythm: Normal rate and regular rhythm. Heart sounds: Normal heart sounds. Pulmonary:      Effort: Pulmonary effort is normal. No respiratory distress. Breath sounds: Normal breath sounds. Comments: Lungs CTA  Not working to breathe  Abdominal:      General: Bowel sounds are normal.      Palpations: Abdomen is soft. Comments: Abdomen soft, nontender  Nondistended  No guarding or rigidity   Musculoskeletal:         General: Normal range of motion. Skin:     General: Skin is warm. Findings: No rash. Neurological:      Mental Status: He is alert and oriented to person, place, and time. Cranial Nerves: No cranial nerve deficit.       Comments: Mild tremor of hands at rest   Psychiatric:         Behavior: Behavior normal.         Diagnostic Study Results     Labs -     Recent Results (from the past 12 hour(s))   CBC WITH AUTOMATED DIFF    Collection Time: 06/22/21  5:21 PM   Result Value Ref Range    WBC 3.1 (L) 4.6 - 13.2 K/uL    RBC 2.85 (L) 4.35 - 5.65 M/uL    HGB 10.4 (L) 13.0 - 16.0 g/dL    HCT 28.8 (L) 36.0 - 48.0 %    .1 (H) 74.0 - 97.0 FL    MCH 36.5 (H) 24.0 - 34.0 PG    MCHC 36.1 31.0 - 37.0 g/dL    RDW 13.6 11.6 - 14.5 %    PLATELET 45 (L) 613 - 420 K/uL    MPV 14.0 (H) 9.2 - 11.8 FL    NEUTROPHILS 65 40 - 73 % LYMPHOCYTES 19 (L) 21 - 52 %    MONOCYTES 14 (H) 3 - 10 %    EOSINOPHILS 0 0 - 5 %    BASOPHILS 2 0 - 2 %    ABS. NEUTROPHILS 2.0 1.8 - 8.0 K/UL    ABS. LYMPHOCYTES 0.6 (L) 0.9 - 3.6 K/UL    ABS. MONOCYTES 0.4 0.05 - 1.2 K/UL    ABS. EOSINOPHILS 0.0 0.0 - 0.4 K/UL    ABS. BASOPHILS 0.1 0.0 - 0.1 K/UL    DF AUTOMATED      PLATELET COMMENTS DECREASED PLATELETS      RBC COMMENTS NORMOCYTIC, NORMOCHROMIC     METABOLIC PANEL, COMPREHENSIVE    Collection Time: 06/22/21  5:21 PM   Result Value Ref Range    Sodium 140 136 - 145 mmol/L    Potassium 3.4 (L) 3.5 - 5.5 mmol/L    Chloride 101 100 - 111 mmol/L    CO2 25 21 - 32 mmol/L    Anion gap 14 3.0 - 18 mmol/L    Glucose 93 74 - 99 mg/dL    BUN 12 7.0 - 18 MG/DL    Creatinine 0.66 0.6 - 1.3 MG/DL    BUN/Creatinine ratio 18 12 - 20      GFR est AA >60 >60 ml/min/1.73m2    GFR est non-AA >60 >60 ml/min/1.73m2    Calcium 8.6 8.5 - 10.1 MG/DL    Bilirubin, total 1.2 (H) 0.2 - 1.0 MG/DL    ALT (SGPT) 141 (H) 16 - 61 U/L    AST (SGOT) 312 (H) 10 - 38 U/L    Alk. phosphatase 223 (H) 45 - 117 U/L    Protein, total 7.9 6.4 - 8.2 g/dL    Albumin 3.7 3.4 - 5.0 g/dL    Globulin 4.2 (H) 2.0 - 4.0 g/dL    A-G Ratio 0.9 0.8 - 1.7     LIPASE    Collection Time: 06/22/21  5:21 PM   Result Value Ref Range    Lipase 250 73 - 393 U/L   ETHYL ALCOHOL    Collection Time: 06/22/21  5:21 PM   Result Value Ref Range    ALCOHOL(ETHYL),SERUM 60 (H) 0 - 3 MG/DL   PTT    Collection Time: 06/22/21  6:31 PM   Result Value Ref Range    aPTT 30.3 23.0 - 36.4 SEC   PROTHROMBIN TIME + INR    Collection Time: 06/22/21  6:31 PM   Result Value Ref Range    Prothrombin time 15.2 11.5 - 15.2 sec    INR 1.2 0.8 - 1.2         Radiologic Studies -   No orders to display     CT Results  (Last 48 hours)    None        CXR Results  (Last 48 hours)    None          Medical Decision Making   I am the first provider for this patient.     I reviewed the vital signs, available nursing notes, past medical history, past surgical history, family history and social history. Vital Signs-Reviewed the patient's vital signs. Patient Vitals for the past 12 hrs:   Temp Pulse Resp BP SpO2   06/22/21 1904 99 °F (37.2 °C) 80 16 (!) 136/92 99 %   06/22/21 1659 99.1 °F (37.3 °C) 84 17 (!) 144/99 98 %         Records Reviewed: Nursing Notes and Old Medical Records    Provider Notes (Medical Decision Making):   DDx: Alcohol intoxication, Alcohol withdrawal, Electrolyte abnormality, Dehydration    43 yo M who presents with vomiting. Patient reports he recently stopped drinking alcohol today and is starting alcohol detox tomorrow. Patient reports multiple bouts of vomiting with no abdominal pain. Chronically elevated liver enzymes with chronic leukopenia, anemia and thrombocytopenia. Discussed with patient and he is aware. Discussed need for prompt GI follow-up. Will discharge home with Zofran to control symptoms until he starts detox in the morning. At time of discharge patient nontoxic-appearing in NAD. Patient stable for prompt outpatient follow-up with PCP 1 to 2 days. Patient given strict instructions to return if symptoms worsen. ED Course:   Initial assessment performed. The patients presenting problems have been discussed, and they are in agreement with the care plan formulated and outlined with them. I have encouraged them to ask questions as they arise throughout their visit. 1953  Pt reassessed. Continues to deny abdominal pain. No abdominal tenderness. Mild tremor at baseline. Additional 1 mg of ativan ordered. Reports improvement of nausea. Pt reassessed. Tremor has subsided. Will discharge home with zofran for sx. Disposition:  Discussed lab and imaging results with pt along with dx and treatment plan. Discussed importance of PCP follow up. All questions answered. Pt voiced they understood. Return if sx worsen. PLAN:  1. Current Discharge Medication List        2.    Follow-up Information None       Return to ED if worse     Diagnosis     Clinical Impression:   1. Alcoholic intoxication without complication ([address]    2. Transaminitis        Attestations:    SHANTELL Castro    Please note that this dictation was completed with [employer]IA, the computer voice recognition software. Quite often unanticipated grammatical, syntax, homophones, and other interpretive errors are inadvertently transcribed by the computer software. Please disregard these errors. Please excuse any errors that have escaped final proofreading. Thank you.

## 2021-06-23 NOTE — ED NOTES
I have reviewed discharge instructions with the patient and parent. The patient and parent verbalized understanding. Medication teaching given, to include name, dose, action, and side effects. Patient verbalized understanding of medications. Encouraged patient to voice any concerns with reassurance provided. Instructed not to drive or use heavy machinery while taking this medication. Patient states they have someone to drive them home. Patient armband removed and shredded    Patient Discharged in stable condition.

## 2022-02-09 ENCOUNTER — HOSPITAL ENCOUNTER (OUTPATIENT)
Dept: LAB | Age: 47
Discharge: HOME | End: 2022-02-09

## 2022-02-09 LAB — XX-LABCORP SPECIMEN COL,LCBCF: NORMAL

## 2022-02-09 PROCEDURE — 99001 SPECIMEN HANDLING PT-LAB: CPT

## 2022-06-15 ENCOUNTER — HOSPITAL ENCOUNTER (INPATIENT)
Age: 47
LOS: 3 days | Discharge: LEFT BEFORE DISCHARGE | DRG: 280 | End: 2022-06-18
Attending: STUDENT IN AN ORGANIZED HEALTH CARE EDUCATION/TRAINING PROGRAM | Admitting: HOSPITALIST
Payer: MEDICAID

## 2022-06-15 ENCOUNTER — APPOINTMENT (OUTPATIENT)
Dept: GENERAL RADIOLOGY | Age: 47
DRG: 280 | End: 2022-06-15
Attending: HOSPITALIST
Payer: MEDICAID

## 2022-06-15 DIAGNOSIS — N17.9 AKI (ACUTE KIDNEY INJURY) (HCC): Primary | ICD-10-CM

## 2022-06-15 DIAGNOSIS — F10.939 ALCOHOL WITHDRAWAL SYNDROME WITH COMPLICATION (HCC): ICD-10-CM

## 2022-06-15 PROBLEM — F10.10 ALCOHOL ABUSE: Status: ACTIVE | Noted: 2022-06-15

## 2022-06-15 LAB
ALBUMIN SERPL-MCNC: 4.7 G/DL (ref 3.4–5)
ALBUMIN/GLOB SERPL: 0.9 {RATIO} (ref 0.8–1.7)
ALP SERPL-CCNC: 195 U/L (ref 45–117)
ALT SERPL-CCNC: 225 U/L (ref 16–61)
AMORPH CRY URNS QL MICRO: ABNORMAL
ANION GAP SERPL CALC-SCNC: 38 MMOL/L (ref 3–18)
APPEARANCE UR: ABNORMAL
AST SERPL-CCNC: 369 U/L (ref 10–38)
BACTERIA URNS QL MICRO: ABNORMAL /HPF
BASOPHILS # BLD: 0 K/UL (ref 0–0.1)
BASOPHILS # BLD: 0 K/UL (ref 0–0.1)
BASOPHILS NFR BLD: 0 % (ref 0–2)
BASOPHILS NFR BLD: 0 % (ref 0–2)
BILIRUB SERPL-MCNC: 2.1 MG/DL (ref 0.2–1)
BILIRUB UR QL: NEGATIVE
BUN SERPL-MCNC: 23 MG/DL (ref 7–18)
BUN/CREAT SERPL: 9 (ref 12–20)
CALCIUM SERPL-MCNC: 10 MG/DL (ref 8.5–10.1)
CHLORIDE SERPL-SCNC: 93 MMOL/L (ref 100–111)
CO2 SERPL-SCNC: 4 MMOL/L (ref 21–32)
COLOR UR: YELLOW
CREAT SERPL-MCNC: 2.54 MG/DL (ref 0.6–1.3)
DIFFERENTIAL METHOD BLD: ABNORMAL
DIFFERENTIAL METHOD BLD: ABNORMAL
EOSINOPHIL # BLD: 0 K/UL (ref 0–0.4)
EOSINOPHIL # BLD: 0 K/UL (ref 0–0.4)
EOSINOPHIL NFR BLD: 0 % (ref 0–5)
EOSINOPHIL NFR BLD: 0 % (ref 0–5)
EPITH CASTS URNS QL MICRO: ABNORMAL /LPF (ref 0–5)
ERYTHROCYTE [DISTWIDTH] IN BLOOD BY AUTOMATED COUNT: 13.6 % (ref 11.6–14.5)
ERYTHROCYTE [DISTWIDTH] IN BLOOD BY AUTOMATED COUNT: 14.2 % (ref 11.6–14.5)
GLOBULIN SER CALC-MCNC: 5 G/DL (ref 2–4)
GLUCOSE SERPL-MCNC: 71 MG/DL (ref 74–99)
GLUCOSE UR STRIP.AUTO-MCNC: 500 MG/DL
HCT VFR BLD AUTO: 40.7 % (ref 36–48)
HCT VFR BLD AUTO: 47.4 % (ref 36–48)
HGB BLD-MCNC: 13.1 G/DL (ref 13–16)
HGB BLD-MCNC: 15 G/DL (ref 13–16)
HGB UR QL STRIP: ABNORMAL
HYALINE CASTS URNS QL MICRO: ABNORMAL /LPF (ref 0–2)
IMM GRANULOCYTES # BLD AUTO: 0.2 K/UL (ref 0–0.04)
IMM GRANULOCYTES # BLD AUTO: 0.3 K/UL (ref 0–0.04)
IMM GRANULOCYTES NFR BLD AUTO: 1 % (ref 0–0.5)
IMM GRANULOCYTES NFR BLD AUTO: 2 % (ref 0–0.5)
KETONES UR QL STRIP.AUTO: 40 MG/DL
LEUKOCYTE ESTERASE UR QL STRIP.AUTO: NEGATIVE
LYMPHOCYTES # BLD: 0.6 K/UL (ref 0.9–3.6)
LYMPHOCYTES # BLD: 0.7 K/UL (ref 0.9–3.6)
LYMPHOCYTES NFR BLD: 4 % (ref 21–52)
LYMPHOCYTES NFR BLD: 5 % (ref 21–52)
MCH RBC QN AUTO: 32.9 PG (ref 24–34)
MCH RBC QN AUTO: 33.8 PG (ref 24–34)
MCHC RBC AUTO-ENTMCNC: 31.6 G/DL (ref 31–37)
MCHC RBC AUTO-ENTMCNC: 32.2 G/DL (ref 31–37)
MCV RBC AUTO: 103.9 FL (ref 78–100)
MCV RBC AUTO: 104.9 FL (ref 78–100)
MONOCYTES # BLD: 1 K/UL (ref 0.05–1.2)
MONOCYTES # BLD: 1.6 K/UL (ref 0.05–1.2)
MONOCYTES NFR BLD: 11 % (ref 3–10)
MONOCYTES NFR BLD: 7 % (ref 3–10)
MUCOUS THREADS URNS QL MICRO: ABNORMAL /LPF
NEUTS SEG # BLD: 11.5 K/UL (ref 1.8–8)
NEUTS SEG # BLD: 13 K/UL (ref 1.8–8)
NEUTS SEG NFR BLD: 82 % (ref 40–73)
NEUTS SEG NFR BLD: 88 % (ref 40–73)
NITRITE UR QL STRIP.AUTO: NEGATIVE
NRBC # BLD: 0.02 K/UL (ref 0–0.01)
NRBC # BLD: 0.02 K/UL (ref 0–0.01)
NRBC BLD-RTO: 0.1 PER 100 WBC
NRBC BLD-RTO: 0.1 PER 100 WBC
PH UR STRIP: 5 [PH] (ref 5–8)
PLATELET # BLD AUTO: 34 K/UL (ref 135–420)
PLATELET # BLD AUTO: 37 K/UL (ref 135–420)
PLATELET COMMENTS,PCOM: ABNORMAL
PMV BLD AUTO: 12.7 FL (ref 9.2–11.8)
PMV BLD AUTO: 13 FL (ref 9.2–11.8)
POTASSIUM SERPL-SCNC: 5.4 MMOL/L (ref 3.5–5.5)
PROT SERPL-MCNC: 9.7 G/DL (ref 6.4–8.2)
PROT UR STRIP-MCNC: 300 MG/DL
RBC # BLD AUTO: 3.88 M/UL (ref 4.35–5.65)
RBC # BLD AUTO: 4.56 M/UL (ref 4.35–5.65)
RBC #/AREA URNS HPF: ABNORMAL /HPF (ref 0–5)
RBC MORPH BLD: ABNORMAL
SODIUM SERPL-SCNC: 135 MMOL/L (ref 136–145)
SP GR UR REFRACTOMETRY: 1.01 (ref 1–1.03)
UROBILINOGEN UR QL STRIP.AUTO: 1 EU/DL (ref 0.2–1)
WBC # BLD AUTO: 14.1 K/UL (ref 4.6–13.2)
WBC # BLD AUTO: 14.8 K/UL (ref 4.6–13.2)
WBC URNS QL MICRO: ABNORMAL /HPF (ref 0–4)

## 2022-06-15 PROCEDURE — 74011000250 HC RX REV CODE- 250: Performed by: HOSPITALIST

## 2022-06-15 PROCEDURE — 99223 1ST HOSP IP/OBS HIGH 75: CPT | Performed by: HOSPITALIST

## 2022-06-15 PROCEDURE — 71045 X-RAY EXAM CHEST 1 VIEW: CPT

## 2022-06-15 PROCEDURE — 80053 COMPREHEN METABOLIC PANEL: CPT

## 2022-06-15 PROCEDURE — 74011250637 HC RX REV CODE- 250/637: Performed by: STUDENT IN AN ORGANIZED HEALTH CARE EDUCATION/TRAINING PROGRAM

## 2022-06-15 PROCEDURE — 74011250636 HC RX REV CODE- 250/636: Performed by: HOSPITALIST

## 2022-06-15 PROCEDURE — 74011250636 HC RX REV CODE- 250/636: Performed by: STUDENT IN AN ORGANIZED HEALTH CARE EDUCATION/TRAINING PROGRAM

## 2022-06-15 PROCEDURE — 74011000258 HC RX REV CODE- 258: Performed by: STUDENT IN AN ORGANIZED HEALTH CARE EDUCATION/TRAINING PROGRAM

## 2022-06-15 PROCEDURE — 96374 THER/PROPH/DIAG INJ IV PUSH: CPT

## 2022-06-15 PROCEDURE — 65660000004 HC RM CVT STEPDOWN

## 2022-06-15 PROCEDURE — 85025 COMPLETE CBC W/AUTO DIFF WBC: CPT

## 2022-06-15 PROCEDURE — 96361 HYDRATE IV INFUSION ADD-ON: CPT

## 2022-06-15 PROCEDURE — 99285 EMERGENCY DEPT VISIT HI MDM: CPT

## 2022-06-15 PROCEDURE — 81001 URINALYSIS AUTO W/SCOPE: CPT

## 2022-06-15 RX ORDER — SODIUM CHLORIDE 0.9 % (FLUSH) 0.9 %
5-40 SYRINGE (ML) INJECTION AS NEEDED
Status: DISCONTINUED | OUTPATIENT
Start: 2022-06-15 | End: 2022-06-18 | Stop reason: HOSPADM

## 2022-06-15 RX ORDER — FOLIC ACID 1 MG/1
1 TABLET ORAL DAILY
Status: DISCONTINUED | OUTPATIENT
Start: 2022-06-15 | End: 2022-06-18 | Stop reason: HOSPADM

## 2022-06-15 RX ORDER — ACETAMINOPHEN 500 MG
1000 TABLET ORAL
Status: COMPLETED | OUTPATIENT
Start: 2022-06-15 | End: 2022-06-15

## 2022-06-15 RX ORDER — LORAZEPAM 1 MG/1
2 TABLET ORAL
Status: DISCONTINUED | OUTPATIENT
Start: 2022-06-15 | End: 2022-06-18 | Stop reason: HOSPADM

## 2022-06-15 RX ORDER — LORAZEPAM 2 MG/ML
2 INJECTION, SOLUTION INTRAMUSCULAR; INTRAVENOUS
Status: DISCONTINUED | OUTPATIENT
Start: 2022-06-15 | End: 2022-06-18 | Stop reason: HOSPADM

## 2022-06-15 RX ORDER — DEXTROSE MONOHYDRATE AND SODIUM CHLORIDE 5; .9 G/100ML; G/100ML
1000 INJECTION, SOLUTION INTRAVENOUS CONTINUOUS
Status: DISCONTINUED | OUTPATIENT
Start: 2022-06-15 | End: 2022-06-16

## 2022-06-15 RX ORDER — ONDANSETRON 2 MG/ML
4 INJECTION INTRAMUSCULAR; INTRAVENOUS ONCE
Status: COMPLETED | OUTPATIENT
Start: 2022-06-15 | End: 2022-06-15

## 2022-06-15 RX ORDER — CHLORDIAZEPOXIDE HYDROCHLORIDE 25 MG/1
50 CAPSULE, GELATIN COATED ORAL ONCE
Status: COMPLETED | OUTPATIENT
Start: 2022-06-15 | End: 2022-06-15

## 2022-06-15 RX ORDER — DEXTROSE MONOHYDRATE AND SODIUM CHLORIDE 5; .9 G/100ML; G/100ML
1000 INJECTION, SOLUTION INTRAVENOUS CONTINUOUS
Status: DISCONTINUED | OUTPATIENT
Start: 2022-06-15 | End: 2022-06-15 | Stop reason: SDUPTHER

## 2022-06-15 RX ORDER — CHLORDIAZEPOXIDE HYDROCHLORIDE 25 MG/1
50 CAPSULE, GELATIN COATED ORAL SEE ADMIN INSTRUCTIONS
Status: DISCONTINUED | OUTPATIENT
Start: 2022-06-15 | End: 2022-06-15 | Stop reason: SDUPTHER

## 2022-06-15 RX ORDER — SODIUM CHLORIDE 9 MG/ML
100 INJECTION, SOLUTION INTRAVENOUS CONTINUOUS
Status: DISCONTINUED | OUTPATIENT
Start: 2022-06-15 | End: 2022-06-15 | Stop reason: DRUGHIGH

## 2022-06-15 RX ORDER — LANOLIN ALCOHOL/MO/W.PET/CERES
100 CREAM (GRAM) TOPICAL DAILY
Status: DISCONTINUED | OUTPATIENT
Start: 2022-06-15 | End: 2022-06-18 | Stop reason: HOSPADM

## 2022-06-15 RX ORDER — LORAZEPAM 2 MG/ML
1 INJECTION, SOLUTION INTRAMUSCULAR; INTRAVENOUS
Status: DISCONTINUED | OUTPATIENT
Start: 2022-06-15 | End: 2022-06-18 | Stop reason: HOSPADM

## 2022-06-15 RX ORDER — THERA TABS 400 MCG
1 TAB ORAL DAILY
Status: DISCONTINUED | OUTPATIENT
Start: 2022-06-16 | End: 2022-06-18 | Stop reason: HOSPADM

## 2022-06-15 RX ORDER — LORAZEPAM 2 MG/ML
3 INJECTION, SOLUTION INTRAMUSCULAR; INTRAVENOUS
Status: DISCONTINUED | OUTPATIENT
Start: 2022-06-15 | End: 2022-06-18 | Stop reason: HOSPADM

## 2022-06-15 RX ORDER — SODIUM CHLORIDE 0.9 % (FLUSH) 0.9 %
5-40 SYRINGE (ML) INJECTION EVERY 8 HOURS
Status: DISCONTINUED | OUTPATIENT
Start: 2022-06-15 | End: 2022-06-18 | Stop reason: HOSPADM

## 2022-06-15 RX ORDER — LORAZEPAM 2 MG/ML
1 INJECTION, SOLUTION INTRAMUSCULAR; INTRAVENOUS ONCE
Status: COMPLETED | OUTPATIENT
Start: 2022-06-15 | End: 2022-06-15

## 2022-06-15 RX ORDER — ONDANSETRON 2 MG/ML
4 INJECTION INTRAMUSCULAR; INTRAVENOUS
Status: DISCONTINUED | OUTPATIENT
Start: 2022-06-15 | End: 2022-06-18 | Stop reason: HOSPADM

## 2022-06-15 RX ORDER — POLYETHYLENE GLYCOL 3350 17 G/17G
17 POWDER, FOR SOLUTION ORAL DAILY PRN
Status: DISCONTINUED | OUTPATIENT
Start: 2022-06-15 | End: 2022-06-18 | Stop reason: HOSPADM

## 2022-06-15 RX ORDER — ONDANSETRON 8 MG/1
4 TABLET, ORALLY DISINTEGRATING ORAL
Status: DISCONTINUED | OUTPATIENT
Start: 2022-06-15 | End: 2022-06-18 | Stop reason: HOSPADM

## 2022-06-15 RX ORDER — LORAZEPAM 1 MG/1
1 TABLET ORAL
Status: DISCONTINUED | OUTPATIENT
Start: 2022-06-15 | End: 2022-06-18 | Stop reason: HOSPADM

## 2022-06-15 RX ADMIN — CHLORDIAZEPOXIDE HYDROCHLORIDE 50 MG: 25 CAPSULE ORAL at 07:08

## 2022-06-15 RX ADMIN — LORAZEPAM 1 MG: 2 INJECTION, SOLUTION INTRAMUSCULAR; INTRAVENOUS at 15:16

## 2022-06-15 RX ADMIN — ACETAMINOPHEN 1000 MG: 500 TABLET ORAL at 10:12

## 2022-06-15 RX ADMIN — DEXTROSE AND SODIUM CHLORIDE 1000 ML: 5; 900 INJECTION, SOLUTION INTRAVENOUS at 08:42

## 2022-06-15 RX ADMIN — LORAZEPAM 1 MG: 2 INJECTION, SOLUTION INTRAMUSCULAR; INTRAVENOUS at 10:49

## 2022-06-15 RX ADMIN — SODIUM CHLORIDE, PRESERVATIVE FREE 10 ML: 5 INJECTION INTRAVENOUS at 15:17

## 2022-06-15 RX ADMIN — ONDANSETRON 4 MG: 2 INJECTION INTRAMUSCULAR; INTRAVENOUS at 15:15

## 2022-06-15 RX ADMIN — ONDANSETRON 4 MG: 2 INJECTION INTRAMUSCULAR; INTRAVENOUS at 07:08

## 2022-06-15 RX ADMIN — THIAMINE HYDROCHLORIDE: 100 INJECTION, SOLUTION INTRAMUSCULAR; INTRAVENOUS at 15:09

## 2022-06-15 RX ADMIN — DEXTROSE AND SODIUM CHLORIDE 1000 ML: 5; 900 INJECTION, SOLUTION INTRAVENOUS at 10:25

## 2022-06-15 RX ADMIN — SODIUM CHLORIDE 1000 ML: 900 INJECTION, SOLUTION INTRAVENOUS at 07:09

## 2022-06-15 NOTE — ED PROVIDER NOTES
EMERGENCY DEPARTMENT HISTORY AND PHYSICAL EXAM    6:38 AM      Date: 6/15/2022  Patient Name: Ирина Cotter    History of Presenting Illness     Chief Complaint   Patient presents with    Nausea         History Provided By: Patient  Location/Duration/Severity/Modifying factors   Patient is a 29-year-old male with history of alcohol abuse presenting due to nausea vomiting dehydration concern for alcohol withdrawal.  Patient recently discharged from alcohol rehab roughly 2 months ago. Patient started back to drinking roughly 2 weeks ago, states he started to have some nausea vomiting and the symptoms continued through yesterday so he attempted to stop symptoms with taking a shot yesterday but did not help, that was his last drink. Patient now presenting with emesis with some tinge of blood. Patient denies any abdominal pain but states having mild headache. PCP: Jose C Lovelace NP    Current Facility-Administered Medications   Medication Dose Route Frequency Provider Last Rate Last Admin    sodium chloride 0.9 % bolus infusion 1,000 mL  1,000 mL IntraVENous ONCE Kalpana Hurd MD        ondansetron [facility] injection 4 mg  4 mg IntraVENous ONCE Kalpana Hurd MD        chlordiazePOXIDE (LIBRIUM) capsule 50 mg  50 mg Oral See Admin Instructions Kalpana Hurd MD         Current Outpatient Medications   Medication Sig Dispense Refill    ondansetron (Zofran ODT) 4 mg disintegrating tablet 1 Tablet by SubLINGual route every eight (8) hours as needed for Nausea or Vomiting. 20 Tablet 0    ibuprofen (MOTRIN) 600 mg tablet Take 1 Tab by mouth every six (6) hours as needed for Pain. 20 Tab 0       Past History     Past Medical History:  Past Medical History:   Diagnosis Date    Glaucoma        Past Surgical History:  Past Surgical History:   Procedure Laterality Date    HX HEENT      glaucoma left eye       Family History:  History reviewed. No pertinent family history.     Social History:  Social History     Tobacco Use    Smoking status: Current Every Day Smoker    Smokeless tobacco: Never Used   Substance Use Topics    Alcohol use: Yes    Drug use: No       Allergies:  No Known Allergies      Review of Systems       Review of Systems   Constitutional: Positive for activity change, appetite change and fatigue. Negative for fever. HENT: Negative for congestion and rhinorrhea. Eyes: Negative for visual disturbance. Respiratory: Negative for shortness of breath. Cardiovascular: Negative for chest pain and palpitations. Gastrointestinal: Positive for abdominal pain, nausea and vomiting. Negative for diarrhea. Genitourinary: Negative for dysuria and hematuria. Musculoskeletal: Negative for back pain. Skin: Negative for rash. Neurological: Positive for tremors, weakness, light-headedness and headaches. Negative for dizziness and seizures. All other systems reviewed and are negative. Physical Exam     Visit Vitals  BP (!) 124/92 (BP 1 Location: Left upper arm, BP Patient Position: At rest)   Pulse 100   Temp 97 °F (36.1 °C)   Resp 16   Wt 61.2 kg (135 lb)   SpO2 96%   BMI 18.83 kg/m²         Physical Exam  Vitals and nursing note reviewed. Constitutional:       Appearance: He is well-developed. Comments: Appeared nauseous and uncomfortable actively vomiting   HENT:      Head: Normocephalic and atraumatic. Right Ear: External ear normal.      Left Ear: External ear normal.      Nose: Nose normal.      Mouth/Throat:      Mouth: Mucous membranes are dry. Comments: Positive tongue fasciculations  Eyes:      General: No scleral icterus. Extraocular Movements: Extraocular movements intact. Conjunctiva/sclera: Conjunctivae normal.   Cardiovascular:      Rate and Rhythm: Regular rhythm. Tachycardia present. Heart sounds: Normal heart sounds. No murmur heard. No friction rub. No gallop.     Pulmonary:      Effort: Pulmonary effort is normal. Breath sounds: Normal breath sounds. Chest:      Chest wall: No tenderness. Abdominal:      General: Bowel sounds are normal. There is no distension. Palpations: Abdomen is soft. Tenderness: There is no abdominal tenderness. Musculoskeletal:         General: No tenderness. Normal range of motion. Cervical back: Normal range of motion. Skin:     General: Skin is warm and dry. Neurological:      General: No focal deficit present. Mental Status: He is alert and oriented to person, place, and time. Sensory: No sensory deficit. Motor: No weakness. Comments: Positive asterixis with bilateral hands. Psychiatric:         Behavior: Behavior normal.         Thought Content: Thought content normal.         Judgment: Judgment normal.           Diagnostic Study Results     Labs -  No results found for this or any previous visit (from the past 12 hour(s)). Radiologic Studies -   No orders to display         Medical Decision Making   I am the first provider for this patient. I reviewed the vital signs, available nursing notes, past medical history, past surgical history, family history and social history. Vital Signs-Reviewed the patient's vital signs. EKG:     Records Reviewed: Nursing Notes (Time of Review: 6:38 AM)    ED Course: Progress Notes, Reevaluation, and Consults:         Provider Notes (Medical Decision Making):   MDM  Number of Diagnoses or Management Options  Diagnosis management comments: Patient is a 61-year-old male with history of alcohol abuse presenting due to nausea vomiting dehydration concern for alcohol withdrawal.  Patient presented with nausea vomiting tongue fasciculations and asterixis concerning for alcohol withdrawal.  Will attempt to treat symptoms with Librium and Ativan IV fluids. Plan discussed with CBC CMP EKG.   If patient continues to not be able to tolerate p.o., has emesis and if labs are abnormal then likely will admit for alcohol withdrawal.      Procedures    Critical Care Time:       Diagnosis     Clinical Impression: No diagnosis found. Disposition: Admission    Follow-up Information    None          Patient's Medications   Start Taking    No medications on file   Continue Taking    IBUPROFEN (MOTRIN) 600 MG TABLET    Take 1 Tab by mouth every six (6) hours as needed for Pain. ONDANSETRON (ZOFRAN ODT) 4 MG DISINTEGRATING TABLET    1 Tablet by SubLINGual route every eight (8) hours as needed for Nausea or Vomiting. These Medications have changed    No medications on file   Stop Taking    No medications on file     Disclaimer: Sections of this note are dictated using utilizing voice recognition software. Minor typographical errors may be present. If questions arise, please do not hesitate to contact me or call our department.

## 2022-06-15 NOTE — Clinical Note
Status[de-identified] INPATIENT [101]   Type of Bed: Stepdown [17]   Cardiac Monitoring Required?: Yes   Inpatient Hospitalization Certified Necessary for the Following Reasons: 3. Patient receiving treatment that can only be provided in an inpatient setting (further clarification in H&P documentation)   Admitting Diagnosis: Alcohol withdrawal ([facility] [address] [291.81. ICD-9-CM]   Admitting Diagnosis: DEEPTHI (acute kidney injury) [facility]) [3292302]   Admitting Physician: Varsha Santiago [[id number]]   Attending Physician: Varsha Santiago [[id number]]   Estimated Length of Stay: 2 Midnights   Discharge Plan[de-identified] Home with Office Follow-up

## 2022-06-15 NOTE — ED NOTES
20G PIV placed to pt's L FA. Labs collected and sent to lab. IV fluid bolus initiated. Allergies verified. IV and PO meds administered. See MAR.

## 2022-06-15 NOTE — ED NOTES
Pt asking for a lunch tray. Informed pt that the provider has ordered that he is not allowed to eat at the moment, but I would call and ask if he could change the order.

## 2022-06-15 NOTE — ED NOTES
Pt refusing to give urine sample at this time stating, \"I don't feel like getting up, can I get the 2nd bag of fluids first?\"

## 2022-06-15 NOTE — H&P
HISTORY & PHYSICAL      Patient: Mavis Hoang MRN: [id number]  CSN: [id number]    YOB: 1975  Age: 41 y.o. Sex: male    DOA: 6/15/2022 LOS:  LOS: 0 days        DOA: 6/15/2022        Assessment/Plan     Active Problems:    Alcohol withdrawal ([facility] [address] (6/15/2022)      DEEPTHI (acute kidney injury) ([facility] [address] (6/15/2022)      Alcohol abuse (6/15/2022)        Patient Active Problem List   Diagnosis Code    Alcohol withdrawal ([facility] [address] F10.239    [facility]TH[facility]) [facility] N17.9    Alcohol abuse F10.10         Plan:  1. Alcohol abuse- placed on CIWA protocol. Continue to monitor, start MVI, thiamine and folic acid. 2. DEEPTHI-IVF, monitor creatinine, if no improvement will consult nephrology for further input. 3. Thrombocytopenia and elevated LFTs-likely secondary to stigmata of liver disease. 4. Elevated glucose in urine with low bicarb on CMP-check A1c to rule out diabetes. 5. Hyperkalemia-likely secondary to DEEPTHI, repeat BMP stat. DVT prophylaxis-SCDs  Full code              79-year-old male with a history of alcohol abuse presents to the emergency room secondary to generalized weakness. Patient is a poor historian, unable to give a significant amount of information. Patient mentions he was discharged from detox clinic on 4/11/2022. He was at a detox clinic in [address] [place] [address]. Patient unable to give me further details. He says he drinks about 4-5 airplane mini bottles of alcohol daily. ER evaluation-patient noted to have leukocytosis, thrombocytopenia, DEEPTHI, elevated LFTs likely secondary to stigmata of alcoholic liver disease. Patient is being admitted to the hospital for further evaluation. Past Medical History:   Diagnosis Date    Glaucoma        Past Surgical History:   Procedure Laterality Date    HX HEENT      glaucoma left eye       History reviewed. No pertinent family history.     Social History     Socioeconomic History    Marital status: SINGLE   Tobacco Use    Smoking status: Current Every Day Smoker    Smokeless tobacco: Never Used   Substance and Sexual Activity    Alcohol use: Yes    Drug use: No    Sexual activity: Yes     Partners: Female       Prior to Admission medications    Medication Sig Start Date End Date Taking? Authorizing Provider   ondansetron (Zofran ODT) 4 mg disintegrating tablet 1 Tablet by SubLINGual route every eight (8) hours as needed for Nausea or Vomiting. 6/22/21   SHANTELL Bowens   ibuprofen (MOTRIN) 600 mg tablet Take 1 Tab by mouth every six (6) hours as needed for Pain. 6/3/20   Enrico Kussmaul, MD       No Known Allergies    Review of Systems:    Pertinent Positives noted in HPI. Rest all other ROS were noted to be negative. Physical Exam:      Visit Vitals  BP (!) 124/92 (BP 1 Location: Left upper arm, BP Patient Position: At rest)   Pulse 100   Temp 97 °F (36.1 °C)   Resp 16   Wt 61.2 kg (135 lb)   SpO2 96%   BMI 18.83 kg/m²       Physical Exam:    Gen: In general, this is a poorly nourished male in no acute distress  HEENT: Sclerae nonicteric. Oral mucous membranes dry, dentition poor  Neck: Supple with midline trachea. CV: RRR without murmur or rub appreciated. Resp:Respirations are unlabored without use of accessory muscles. Lung fields B/L without wheezes or rhonchi. Abd: Soft, nontender, nondistended. Extrem: Extremities are warm, without cyanosis or clubbing. No pitting pretibial edema. Skin: Warm, no visible rashes. Neuro: Patient is alert, oriented, and cooperative. No obvious focal defects. Moves all 4 extremities.     Labs Reviewed:    Recent Results (from the past 24 hour(s))   CBC WITH AUTOMATED DIFF    Collection Time: 06/15/22  7:15 AM   Result Value Ref Range    WBC 14.1 (H) 4.6 - 13.2 K/uL    RBC 4.56 4.35 - 5.65 M/uL    HGB 15.0 13.0 - 16.0 g/dL    HCT 47.4 36.0 - 48.0 %    .9 (H) 78.0 - 100.0 FL    MCH 32.9 24.0 - 34.0 PG    MCHC 31.6 31.0 - 37.0 g/dL    RDW 13.6 11.6 - 14.5 %    PLATELET 37 (L) 135 - 420 K/uL    MPV 13.0 (H) 9.2 - 11.8 FL    NRBC 0.1 (H) 0  WBC    ABSOLUTE NRBC 0.02 (H) 0.00 - 0.01 K/uL    NEUTROPHILS 82 (H) 40 - 73 %    LYMPHOCYTES 5 (L) 21 - 52 %    MONOCYTES 11 (H) 3 - 10 %    EOSINOPHILS 0 0 - 5 %    BASOPHILS 0 0 - 2 %    IMMATURE GRANULOCYTES 2 (H) 0.0 - 0.5 %    ABS. NEUTROPHILS 11.5 (H) 1.8 - 8.0 K/UL    ABS. LYMPHOCYTES 0.7 (L) 0.9 - 3.6 K/UL    ABS. MONOCYTES 1.6 (H) 0.05 - 1.2 K/UL    ABS. EOSINOPHILS 0.0 0.0 - 0.4 K/UL    ABS. BASOPHILS 0.0 0.0 - 0.1 K/UL    ABS. IMM. GRANS. 0.3 (H) 0.00 - 0.04 K/UL    DF SMEAR SCANNED      PLATELET COMMENTS DECREASED PLATELETS      RBC COMMENTS TEARDROP CELLS  1+       METABOLIC PANEL, COMPREHENSIVE    Collection Time: 06/15/22  7:15 AM   Result Value Ref Range    Sodium 135 (L) 136 - 145 mmol/L    Potassium 5.4 3.5 - 5.5 mmol/L    Chloride 93 (L) 100 - 111 mmol/L    CO2 4 (LL) 21 - 32 mmol/L    Anion gap 38 (H) 3.0 - 18 mmol/L    Glucose 71 (L) 74 - 99 mg/dL    BUN 23 (H) 7.0 - 18 MG/DL    Creatinine 2.54 (H) 0.6 - 1.3 MG/DL    BUN/Creatinine ratio 9 (L) 12 - 20      GFR est AA 33 (L) >60 ml/min/1.73m2    GFR est non-AA 28 (L) >60 ml/min/1.73m2    Calcium 10.0 8.5 - 10.1 MG/DL    Bilirubin, total 2.1 (H) 0.2 - 1.0 MG/DL    ALT (SGPT) 225 (H) 16 - 61 U/L    AST (SGOT) 369 (H) 10 - 38 U/L    Alk.  phosphatase 195 (H) 45 - 117 U/L    Protein, total 9.7 (H) 6.4 - 8.2 g/dL    Albumin 4.7 3.4 - 5.0 g/dL    Globulin 5.0 (H) 2.0 - 4.0 g/dL    A-G Ratio 0.9 0.8 - 1.7     CBC WITH AUTOMATED DIFF    Collection Time: 06/15/22 10:17 AM   Result Value Ref Range    WBC 14.8 (H) 4.6 - 13.2 K/uL    RBC 3.88 (L) 4.35 - 5.65 M/uL    HGB 13.1 13.0 - 16.0 g/dL    HCT 40.7 36.0 - 48.0 %    .9 (H) 78.0 - 100.0 FL    MCH 33.8 24.0 - 34.0 PG    MCHC 32.2 31.0 - 37.0 g/dL    RDW 14.2 11.6 - 14.5 %    PLATELET 34 (L) 736 - 420 K/uL    MPV 12.7 (H) 9.2 - 11.8 FL    NRBC 0.1 (H) 0  WBC    ABSOLUTE NRBC 0.02 (H) 0.00 - 0.01 K/uL    NEUTROPHILS 88 (H) 40 - 73 %    LYMPHOCYTES 4 (L) 21 - 52 %    MONOCYTES 7 3 - 10 %    EOSINOPHILS 0 0 - 5 %    BASOPHILS 0 0 - 2 %    IMMATURE GRANULOCYTES 1 (H) 0.0 - 0.5 %    ABS. NEUTROPHILS 13.0 (H) 1.8 - 8.0 K/UL    ABS. LYMPHOCYTES 0.6 (L) 0.9 - 3.6 K/UL    ABS. MONOCYTES 1.0 0.05 - 1.2 K/UL    ABS. EOSINOPHILS 0.0 0.0 - 0.4 K/UL    ABS. BASOPHILS 0.0 0.0 - 0.1 K/UL    ABS. IMM. GRANS. 0.2 (H) 0.00 - 0.04 K/UL    DF AUTOMATED         Imaging Reviewed:    XR Results (most recent):  Results from Hospital Encounter encounter on 06/02/20    XR SACRUM AND COCCYX    Narrative  SACRUM/COCCYX RADIOGRAPH-3 VIEWS    INDICATION: Fall    COMPARISON: None    FINDINGS:  Subtle irregularity at the distal sacrum/sacrococcygeal junction, possibly a  fracture. Sacroiliac joints and hip joints appear grossly intact. No obvious  soft tissue abnormality. Impression  IMPRESSION:  Subtle irregularity at the distal sacrum/sacrococcygeal junction, possibly a  fracture. CT Results (most recent):  No results found for this or any previous visit. Felicia Danielle MD  6/15/2022, 10:54 AM        Disclaimer: Sections of this note are dictated using utilizing voice recognition software. Minor typographical errors may be present. If questions arise, please do not hesitate to contact me or call our department.

## 2022-06-15 NOTE — ED NOTES
Security informed me that pt's mother is in the waiting room demanding that the pt be transferred to [facility]. Asked that security inform the charge nurse.

## 2022-06-16 LAB
ALBUMIN SERPL-MCNC: 3.3 G/DL (ref 3.4–5)
ALBUMIN SERPL-MCNC: 3.4 G/DL (ref 3.4–5)
ALBUMIN/GLOB SERPL: 1 {RATIO} (ref 0.8–1.7)
ALBUMIN/GLOB SERPL: 1.1 {RATIO} (ref 0.8–1.7)
ALP SERPL-CCNC: 125 U/L (ref 45–117)
ALP SERPL-CCNC: 146 U/L (ref 45–117)
ALT SERPL-CCNC: 2190 U/L (ref 16–61)
ALT SERPL-CCNC: 2797 U/L (ref 16–61)
AMPHET UR QL SCN: NEGATIVE
ANION GAP SERPL CALC-SCNC: 10 MMOL/L (ref 3–18)
ANION GAP SERPL CALC-SCNC: 8 MMOL/L (ref 3–18)
APAP SERPL-MCNC: <2 UG/ML (ref 10–30)
AST SERPL-CCNC: 5271 U/L (ref 10–38)
AST SERPL-CCNC: ABNORMAL U/L (ref 10–38)
BARBITURATES UR QL SCN: NEGATIVE
BASOPHILS # BLD: 0 K/UL (ref 0–0.1)
BASOPHILS NFR BLD: 0 % (ref 0–2)
BENZODIAZ UR QL: NEGATIVE
BILIRUB SERPL-MCNC: 2.4 MG/DL (ref 0.2–1)
BILIRUB SERPL-MCNC: 2.6 MG/DL (ref 0.2–1)
BUN SERPL-MCNC: 33 MG/DL (ref 7–18)
BUN SERPL-MCNC: 39 MG/DL (ref 7–18)
BUN/CREAT SERPL: 13 (ref 12–20)
BUN/CREAT SERPL: 16 (ref 12–20)
CALCIUM SERPL-MCNC: 8.4 MG/DL (ref 8.5–10.1)
CALCIUM SERPL-MCNC: 8.9 MG/DL (ref 8.5–10.1)
CANNABINOIDS UR QL SCN: NEGATIVE
CHLORIDE SERPL-SCNC: 105 MMOL/L (ref 100–111)
CHLORIDE SERPL-SCNC: 111 MMOL/L (ref 100–111)
CK SERPL-CCNC: 296 U/L (ref 39–308)
CO2 SERPL-SCNC: 19 MMOL/L (ref 21–32)
CO2 SERPL-SCNC: 21 MMOL/L (ref 21–32)
COCAINE UR QL SCN: NEGATIVE
CREAT SERPL-MCNC: 2.06 MG/DL (ref 0.6–1.3)
CREAT SERPL-MCNC: 2.9 MG/DL (ref 0.6–1.3)
DIFFERENTIAL METHOD BLD: ABNORMAL
EOSINOPHIL # BLD: 0 K/UL (ref 0–0.4)
EOSINOPHIL NFR BLD: 0 % (ref 0–5)
ERYTHROCYTE [DISTWIDTH] IN BLOOD BY AUTOMATED COUNT: 12.9 % (ref 11.6–14.5)
GLOBULIN SER CALC-MCNC: 3 G/DL (ref 2–4)
GLOBULIN SER CALC-MCNC: 3.3 G/DL (ref 2–4)
GLUCOSE BLD STRIP.AUTO-MCNC: 139 MG/DL (ref 70–110)
GLUCOSE SERPL-MCNC: 177 MG/DL (ref 74–99)
GLUCOSE SERPL-MCNC: 261 MG/DL (ref 74–99)
HCT VFR BLD AUTO: 32 % (ref 36–48)
HDSCOM,HDSCOM: NORMAL
HGB BLD-MCNC: 11.6 G/DL (ref 13–16)
IMM GRANULOCYTES # BLD AUTO: 0 K/UL
IMM GRANULOCYTES NFR BLD AUTO: 0 %
INR PPP: 1.3 (ref 0.8–1.2)
INR PPP: 1.4 (ref 0.8–1.2)
INR PPP: 1.5 (ref 0.8–1.2)
LYMPHOCYTES # BLD: 0.2 K/UL (ref 0.9–3.6)
LYMPHOCYTES NFR BLD: 4 % (ref 21–52)
MCH RBC QN AUTO: 33.1 PG (ref 24–34)
MCHC RBC AUTO-ENTMCNC: 36.3 G/DL (ref 31–37)
MCV RBC AUTO: 91.4 FL (ref 78–100)
METHADONE UR QL: NEGATIVE
MONOCYTES # BLD: 0.1 K/UL (ref 0.05–1.2)
MONOCYTES NFR BLD: 1 % (ref 3–10)
NEUTS BAND NFR BLD MANUAL: 5 % (ref 0–5)
NEUTS SEG # BLD: 5.3 K/UL (ref 1.8–8)
NEUTS SEG NFR BLD: 90 % (ref 40–73)
NRBC # BLD: 0 K/UL (ref 0–0.01)
NRBC BLD-RTO: 0 PER 100 WBC
OPIATES UR QL: NEGATIVE
PCP UR QL: NEGATIVE
PLATELET # BLD AUTO: 24 K/UL (ref 135–420)
PLATELET COMMENTS,PCOM: ABNORMAL
POTASSIUM SERPL-SCNC: 3.9 MMOL/L (ref 3.5–5.5)
POTASSIUM SERPL-SCNC: 4.3 MMOL/L (ref 3.5–5.5)
PROT SERPL-MCNC: 6.4 G/DL (ref 6.4–8.2)
PROT SERPL-MCNC: 6.6 G/DL (ref 6.4–8.2)
PROTHROMBIN TIME: 16.5 SEC (ref 11.5–15.2)
PROTHROMBIN TIME: 17.9 SEC (ref 11.5–15.2)
PROTHROMBIN TIME: 18.3 SEC (ref 11.5–15.2)
RBC # BLD AUTO: 3.5 M/UL (ref 4.35–5.65)
RBC MORPH BLD: ABNORMAL
SODIUM SERPL-SCNC: 134 MMOL/L (ref 136–145)
SODIUM SERPL-SCNC: 140 MMOL/L (ref 136–145)
WBC # BLD AUTO: 5.6 K/UL (ref 4.6–13.2)

## 2022-06-16 PROCEDURE — 74011000258 HC RX REV CODE- 258: Performed by: HOSPITALIST

## 2022-06-16 PROCEDURE — 83874 ASSAY OF MYOGLOBIN: CPT

## 2022-06-16 PROCEDURE — 80053 COMPREHEN METABOLIC PANEL: CPT

## 2022-06-16 PROCEDURE — 85025 COMPLETE CBC W/AUTO DIFF WBC: CPT

## 2022-06-16 PROCEDURE — 80074 ACUTE HEPATITIS PANEL: CPT

## 2022-06-16 PROCEDURE — 80143 DRUG ASSAY ACETAMINOPHEN: CPT

## 2022-06-16 PROCEDURE — 87389 HIV-1 AG W/HIV-1&-2 AB AG IA: CPT

## 2022-06-16 PROCEDURE — 36415 COLL VENOUS BLD VENIPUNCTURE: CPT

## 2022-06-16 PROCEDURE — 74011250637 HC RX REV CODE- 250/637: Performed by: HOSPITALIST

## 2022-06-16 PROCEDURE — 99233 SBSQ HOSP IP/OBS HIGH 50: CPT | Performed by: STUDENT IN AN ORGANIZED HEALTH CARE EDUCATION/TRAINING PROGRAM

## 2022-06-16 PROCEDURE — 80307 DRUG TEST PRSMV CHEM ANLYZR: CPT

## 2022-06-16 PROCEDURE — 74011000250 HC RX REV CODE- 250: Performed by: HOSPITALIST

## 2022-06-16 PROCEDURE — 65270000029 HC RM PRIVATE

## 2022-06-16 PROCEDURE — 2709999900 HC NON-CHARGEABLE SUPPLY

## 2022-06-16 PROCEDURE — 80347 BENZODIAZEPINES 13 OR MORE: CPT

## 2022-06-16 PROCEDURE — 81002 URINALYSIS NONAUTO W/O SCOPE: CPT

## 2022-06-16 PROCEDURE — 82962 GLUCOSE BLOOD TEST: CPT

## 2022-06-16 PROCEDURE — 85610 PROTHROMBIN TIME: CPT

## 2022-06-16 PROCEDURE — 82550 ASSAY OF CK (CPK): CPT

## 2022-06-16 PROCEDURE — 74011250636 HC RX REV CODE- 250/636: Performed by: HOSPITALIST

## 2022-06-16 RX ORDER — SODIUM CHLORIDE 9 MG/ML
100 INJECTION, SOLUTION INTRAVENOUS CONTINUOUS
Status: DISPENSED | OUTPATIENT
Start: 2022-06-16 | End: 2022-06-17

## 2022-06-16 RX ADMIN — SODIUM CHLORIDE, PRESERVATIVE FREE 10 ML: 5 INJECTION INTRAVENOUS at 06:00

## 2022-06-16 RX ADMIN — SODIUM CHLORIDE, PRESERVATIVE FREE 10 ML: 5 INJECTION INTRAVENOUS at 17:17

## 2022-06-16 RX ADMIN — SODIUM CHLORIDE, PRESERVATIVE FREE 10 ML: 5 INJECTION INTRAVENOUS at 02:19

## 2022-06-16 RX ADMIN — DEXTROSE AND SODIUM CHLORIDE 1000 ML: 5; 900 INJECTION, SOLUTION INTRAVENOUS at 09:57

## 2022-06-16 RX ADMIN — THERA TABS 1 TABLET: TAB at 09:57

## 2022-06-16 RX ADMIN — THIAMINE HYDROCHLORIDE: 100 INJECTION, SOLUTION INTRAMUSCULAR; INTRAVENOUS at 17:59

## 2022-06-16 RX ADMIN — LORAZEPAM 1 MG: 1 TABLET ORAL at 18:00

## 2022-06-16 RX ADMIN — SODIUM CHLORIDE, PRESERVATIVE FREE 10 ML: 5 INJECTION INTRAVENOUS at 21:59

## 2022-06-16 RX ADMIN — DEXTROSE AND SODIUM CHLORIDE 1000 ML: 5; 900 INJECTION, SOLUTION INTRAVENOUS at 02:19

## 2022-06-16 RX ADMIN — Medication 100 MG: at 09:57

## 2022-06-16 RX ADMIN — FOLIC ACID 1 MG: 1 TABLET ORAL at 09:57

## 2022-06-16 NOTE — PROGRESS NOTES
Progress Note  Hospitalist Service    Patient: Yasmeen Melton MRN: [id number]   SSN: [id number]  YOB: 1975   Age: 41 y.o. Sex: male      Admit Date: 6/15/2022    LOS: 1 day   Chief Complaint   Patient presents with    Nausea       Subjective:     Patient sleeping upon entering room. Awoke easily to answer questions. Patient presented yesterday secondary to generalized weakness. Review of Systems:  Patient Endorses   ---------------------------------------------------------------------------------  Constitutional: Negative for fever, chills and diaphoresis. HENT: Negative for hearing loss, +sore throat. Eyes: Negative for blurred vision and double vision. Respiratory: Negative for cough   Cardiovascular: Negative for chest pain and palpitations. Gastrointestinal: +nausea, no vomiting. .   Musculoskeletal: Negative for myalgias    Skin: Negative for itching, jaundice   Neurological: Negative for dizziness and headaches. Psychiatric: Negative for depression and suicidal ideas. Objective:     Vitals:  Visit Vitals  BP (!) 117/94   Pulse (!) 103   Temp 98.9 °F (37.2 °C)   Resp 15   Ht 5' 10.87\" (1.8 m)   Wt 61.2 kg (135 lb)   SpO2 98%   BMI 18.90 kg/m²       Physical Exam:   Gen: thin, chronically ill appearing. HEENT: Sclerae nonicteric. Oral mucous membranes dry, dentition poor  Neck: Supple with midline trachea. CV: RRR without murmur or rub appreciated. Resp: CTA bilaterally, no wheezing. No pain with inspiration. Abd: Soft, nontender, nondistended. Extrem: Extremities are warm, without cyanosis or clubbing. No pitting pretibial edema. Skin: Warm, no visible rashes. Neuro: Patient is alert, oriented, and cooperative. No obvious focal defects. Moves all 4 extremities. Intake and Output:  Current Shift: No intake/output data recorded. Last three shifts: 06/15 0701 - 06/16 1900  In: 2852 [P.O.:120;  I.V.:3200]  Out: 1550 [Urine:1550]    Lab/Data Review:  No results found for this or any previous visit (from the past 12 hour(s)). Key Findings or tests:       Telemetry NONE   Oxygen NONE     Assessment and Plan:     1) Transaminitis: Upon admission - patient with AST of 369, . These have risen to AST to 11,567 and ALT of 2,797. Patient continues to be AOx4. INR at 1.3. DDx to include Fulminant viral hepatitis, shock liver, DILI   #1: Patient on stepdown. INR q4 hours, Tylenol level, Utox & serum tox (serum tox takes 2 days to return usually), Hepatitis panel, US doppler of liver. 2) DEEPTHI: Cr of 2.90   #2. Changed IVF from D5NS to NS given hyperglycemia. 3) Thrombocytopenia    #3) likely secondary to alcoholic liver disease. Hold DVT ppx.   4) Hyperkalemia    #4. Now normalized on BMP. 5) H/o of Alcohol abuse    #5. On CIWA. Continue thiamine, folate.      Diet Regular    DVT Prophylax SCDs   GI Prophylaxis    Code status Full    Disposition TBD on clinical course         Luisa Prescott DO, hospitalist   June 16, 2022

## 2022-06-16 NOTE — PROGRESS NOTES
Julius Janee TRANSFER - IN REPORT:    Telephone report [facility]  being received from ED(unit) for routine progression of care      Report consisted of patients Situation, Background, Assessment and   Recommendations(SBAR). Information from the following report(s) ED Summary, MAR and Cardiac Rhythm ST was reviewed with the receiving nurse. Opportunity for questions and clarification was provided. Assessment completed upon patients arrival to unit and care assumed. 0200  Received from ED Pt. Is AOX 2. IV SL, Pt. denies  pain at this time. Cardiac Rhythm @ SR. Will resume care and monitor Pt. Condition. Pt. Educated on call bell when in need of help and assistance. Pt. verbalized understanding. Bed alarm on.     Pt. Head to toe Assessment Done and documented. Pt. Given chlorhexidene wipes, changed gown. Pt. Educated on NPO status. Pt. Verbalized understanding. 0230  Pt. Re-educated on NPO and call bell when in of help and assistance. 0430  Pt. Resting comfortably in bed.    0600  Pt. Made no complaints continues to rest, easily awaken. 0630  Pt. Mother in room, gave summary of care done to Pt. Pt mother verbalized understanding. Verbal and bedside Shift changed report given to Aliza Burris (oncoming RN) on Pt. Condition. Report consisted of patients Situation, History, Activities, intake/output,  Background, Assessment and Recommendations(SBAR). Information from the following report(s) Kardex, order Summary, Lab results and MAR was reviewed with the receiving nurse. Opportunity for questions and clarification was provided.

## 2022-06-16 NOTE — ACP (ADVANCE CARE PLANNING)
Advance Care Planning   Advance Care Planning Inpatient Note  [address] [facility] Department    Today's Date: 6/16/2022  Unit: [facility] 2 CV STEPDOWN    Received request from rounding. Upon review of chart and communication with care team, patient's decision making abilities are not in question. Patient was/were present in the room during visit. Goals of ACP Conversation:  Discuss Advance Care planning documents    Health Care Decision Makers:    No healthcare decision makers have been documented. Click here to complete [address] including selection of the Healthcare Decision Maker Relationship (ie \"Primary\")    Summary:  No Decision Maker named by patient at this time    Advance Care Planning Documents (Patient Wishes) on file:  None     Assessment:    The  provided the following Interventions:  Initiated a relationship of care and support. Explored issues of reza, belief, spirituality and Yarsanism/ritual needs while hospitalized. Listened empathically. Provided chaplaincy education and information about Spiritual Care Services. Offered prayer and assurance of continued prayers on patient's behalf. Chart reviewed. The following outcomes where achieved:  Patient processed feeling about current hospitalization. Patient expressed gratitude for 's visit. Assessment/Plan:  Patient does not have any Yarsanism/cultural needs that will affect patients preferences in health care. There are no spiritual or Yarsanism issues which require intervention at this time. Chaplains will continue to follow and will provide pastoral care on an as needed/requested basis.  recommends bedside caregivers page  on duty if patient shows signs of acute spiritual or emotional distress.     Interventions:  Deferred conversation as patient not interested in completing an advance directive at this time    Care Preferences Communicated:  No    Outcomes/Plan:  ACP Discussion Postponed    [place] Jimy on 6/16/2022 at 7:21 PM

## 2022-06-16 NOTE — ROUTINE PROCESS
0730 Received report from [facility]. Patient alert and oriented x 4.    0830 Patient asking for food. Still with NPO orders. Inquired from Dr Michelle Mackey via [facility] and regular diet was ordered. Patient advised. 1000 Morning medications given. Denies any pain at this time. 1300 Patient finished lunch and wants to take a nap. No complaints at this time. Call bell and telephone placed within reach. 1500 Patient asleep in the bed, equal and non labored breathing. 1920 Bedside and Verbal shift change report given to [facility] (oncoming nurse). Report included the following information SBAR, Intake/Output, MAR, Recent Results and Cardiac Rhythm NSR/ST. (3) adequate

## 2022-06-17 ENCOUNTER — APPOINTMENT (OUTPATIENT)
Dept: VASCULAR SURGERY | Age: 47
DRG: 280 | End: 2022-06-17
Attending: STUDENT IN AN ORGANIZED HEALTH CARE EDUCATION/TRAINING PROGRAM
Payer: MEDICAID

## 2022-06-17 LAB
ALBUMIN SERPL-MCNC: 3 G/DL (ref 3.4–5)
ALBUMIN/GLOB SERPL: 1 {RATIO} (ref 0.8–1.7)
ALP SERPL-CCNC: 142 U/L (ref 45–117)
ALT SERPL-CCNC: 1702 U/L (ref 16–61)
ANION GAP SERPL CALC-SCNC: 5 MMOL/L (ref 3–18)
AST SERPL-CCNC: 2931 U/L (ref 10–38)
BASOPHILS # BLD: 0.1 K/UL (ref 0–0.1)
BASOPHILS NFR BLD: 1 % (ref 0–2)
BILIRUB SERPL-MCNC: 2.3 MG/DL (ref 0.2–1)
BUN SERPL-MCNC: 27 MG/DL (ref 7–18)
BUN/CREAT SERPL: 18 (ref 12–20)
CALCIUM SERPL-MCNC: 9 MG/DL (ref 8.5–10.1)
CHLORIDE SERPL-SCNC: 113 MMOL/L (ref 100–111)
CO2 SERPL-SCNC: 23 MMOL/L (ref 21–32)
CREAT SERPL-MCNC: 1.52 MG/DL (ref 0.6–1.3)
DIFFERENTIAL METHOD BLD: ABNORMAL
EOSINOPHIL # BLD: 0.1 K/UL (ref 0–0.4)
EOSINOPHIL NFR BLD: 2 % (ref 0–5)
ERYTHROCYTE [DISTWIDTH] IN BLOOD BY AUTOMATED COUNT: 12.8 % (ref 11.6–14.5)
GLOBULIN SER CALC-MCNC: 3 G/DL (ref 2–4)
GLUCOSE SERPL-MCNC: 123 MG/DL (ref 74–99)
HAV IGM SER QL: NEGATIVE
HBV CORE IGM SER QL: NEGATIVE
HBV SURFACE AG SER QL: <0.1 INDEX
HBV SURFACE AG SER QL: NEGATIVE
HCT VFR BLD AUTO: 28.4 % (ref 36–48)
HCV AB SER IA-ACNC: 0.07 INDEX
HCV AB SERPL QL IA: NEGATIVE
HCV COMMENT,HCGAC: NORMAL
HGB BLD-MCNC: 10.4 G/DL (ref 13–16)
HIV 1+2 AB+HIV1 P24 AG SERPL QL IA: NONREACTIVE
HIV12 RESULT COMMENT, HHIVC: NORMAL
IMM GRANULOCYTES # BLD AUTO: 0 K/UL
IMM GRANULOCYTES NFR BLD AUTO: 0 %
INR PPP: 1.4 (ref 0.8–1.2)
INR PPP: 1.4 (ref 0.8–1.2)
LYMPHOCYTES # BLD: 0.5 K/UL (ref 0.9–3.6)
LYMPHOCYTES NFR BLD: 10 % (ref 21–52)
MCH RBC QN AUTO: 32.5 PG (ref 24–34)
MCHC RBC AUTO-ENTMCNC: 36.6 G/DL (ref 31–37)
MCV RBC AUTO: 88.8 FL (ref 78–100)
MONOCYTES # BLD: 0.1 K/UL (ref 0.05–1.2)
MONOCYTES NFR BLD: 2 % (ref 3–10)
NEUTS BAND NFR BLD MANUAL: 3 % (ref 0–5)
NEUTS SEG # BLD: 4.3 K/UL (ref 1.8–8)
NEUTS SEG NFR BLD: 82 % (ref 40–73)
NRBC # BLD: 0 K/UL (ref 0–0.01)
NRBC BLD-RTO: 0 PER 100 WBC
PERIPHERAL SMEAR,PSM: NORMAL
PLATELET # BLD AUTO: 21 K/UL (ref 135–420)
PLATELET COMMENTS,PCOM: ABNORMAL
POTASSIUM SERPL-SCNC: 3.6 MMOL/L (ref 3.5–5.5)
PROT SERPL-MCNC: 6 G/DL (ref 6.4–8.2)
PROTHROMBIN TIME: 17.4 SEC (ref 11.5–15.2)
PROTHROMBIN TIME: 17.4 SEC (ref 11.5–15.2)
RBC # BLD AUTO: 3.2 M/UL (ref 4.35–5.65)
RBC MORPH BLD: ABNORMAL
RBC MORPH BLD: ABNORMAL
SODIUM SERPL-SCNC: 141 MMOL/L (ref 136–145)
SP1: NORMAL
SP2: NORMAL
SP3: NORMAL
WBC # BLD AUTO: 5.1 K/UL (ref 4.6–13.2)

## 2022-06-17 PROCEDURE — 85025 COMPLETE CBC W/AUTO DIFF WBC: CPT

## 2022-06-17 PROCEDURE — 85610 PROTHROMBIN TIME: CPT

## 2022-06-17 PROCEDURE — 93975 VASCULAR STUDY: CPT

## 2022-06-17 PROCEDURE — 2709999900 HC NON-CHARGEABLE SUPPLY

## 2022-06-17 PROCEDURE — 80053 COMPREHEN METABOLIC PANEL: CPT

## 2022-06-17 PROCEDURE — 74011000250 HC RX REV CODE- 250: Performed by: HOSPITALIST

## 2022-06-17 PROCEDURE — 65270000029 HC RM PRIVATE

## 2022-06-17 PROCEDURE — 74011250637 HC RX REV CODE- 250/637: Performed by: HOSPITALIST

## 2022-06-17 PROCEDURE — 99233 SBSQ HOSP IP/OBS HIGH 50: CPT | Performed by: STUDENT IN AN ORGANIZED HEALTH CARE EDUCATION/TRAINING PROGRAM

## 2022-06-17 PROCEDURE — 36415 COLL VENOUS BLD VENIPUNCTURE: CPT

## 2022-06-17 RX ADMIN — Medication 100 MG: at 08:48

## 2022-06-17 RX ADMIN — THERA TABS 1 TABLET: TAB at 08:48

## 2022-06-17 RX ADMIN — SODIUM CHLORIDE, PRESERVATIVE FREE 10 ML: 5 INJECTION INTRAVENOUS at 22:00

## 2022-06-17 RX ADMIN — SODIUM CHLORIDE, PRESERVATIVE FREE 10 ML: 5 INJECTION INTRAVENOUS at 14:00

## 2022-06-17 RX ADMIN — FOLIC ACID 1 MG: 1 TABLET ORAL at 08:48

## 2022-06-17 NOTE — CONSULTS
[email or web address]  [phone]    GASTROENTEROLOGY CONSULT      Impression:   1. Transaminitis - Brief spike in LFTs while inpatient suggests shock liver/ interruption of blood flow as they are now quickly trending back down. Preliminary results of doppler are negative for PVT. Baseline LFTs appear to be alcoholic pattern/alcoholic hepatitis. Urine drug screen and acetaminophen levels normal   - Acute hep panel, serum tox panel, HIV are pending   2. DEEPTHI - Cr 1.52  3. Thrombocytopenia - platelets 21 today, appears to be chronic due to alcoholic liver disease, likely cirrhotic given long standing low platelets. US 2021 shows steatosis. 4. Hyperkalemia - improved  5. Alcohol abuse - CIWA protocol      Plan:     1. Await pending labs  2. Trend LFTs  3. Monitor for mental status changes and increased INR, if this occurs will need to transfer to tertiary center for possible transplant/higher level care. 4. Avoid hepatotoxic medications  5. Continue with CIWA protocol  6. EtOH cessation stressed  7. Given likelihood of cirrhosis he will need screening for varices, he is also due for initial screening colonoscopy. These can be done as OP unless other issues arise indicating sooner need for scopes. 8. Medical management per primary team      Chief Complaint: transaminitis      HPI:  Dieudonne Mclaughlin is a 41 y.o. male who I am being asked to see in consultation for an opinion regarding the above. He presented to  ED due to generalized weakness. He reports being at a detox clinic in [facility], discharged on 4/11/2022. He has been drinking 1/2 to 1 pint per day although states he does not drink every day. Labs in ED showed leukocytosis, thrombocytopenia, DEEPTHI, and elevated LFTs likely due to alcoholic liver disease and was admitted. During encounter he states he is feeling much better and is anxious to go home.  He denies unexplained fevers, jaundice, abdominal pain, acholic stools, unexplained weight loss, change in bowel habits or blood in the stool. He denies fam hx of liver disease, HCC or CRC. PMH:   Past Medical History:   Diagnosis Date    Glaucoma        PSH:   Past Surgical History:   Procedure Laterality Date    HX HEENT      glaucoma left eye       Social HX:   Social History     Socioeconomic History    Marital status: SINGLE     Spouse name: Not on file    Number of children: Not on file    Years of education: Not on file    Highest education level: Not on file   Occupational History    Not on file   Tobacco Use    Smoking status: Current Every Day Smoker    Smokeless tobacco: Never Used   Substance and Sexual Activity    Alcohol use: Yes    Drug use: No    Sexual activity: Yes     Partners: Female   Other Topics Concern    Not on file   Social History Narrative    Not on file     Social Determinants of Health     Financial Resource Strain:     Difficulty of Paying Living Expenses: Not on file   Food Insecurity:     Worried About Running Out of Food in the Last Year: Not on file    Glendy of Food in the Last Year: Not on file   Transportation Needs:     Lack of Transportation (Medical): Not on file    Lack of Transportation (Non-Medical):  Not on file   Physical Activity:     Days of Exercise per Week: Not on file    Minutes of Exercise per Session: Not on file   Stress:     Feeling of Stress : Not on file   Social Connections:     Frequency of Communication with Friends and Family: Not on file    Frequency of Social Gatherings with Friends and Family: Not on file    Attends Mandaeism Services: Not on file    Active Member of Clubs or Organizations: Not on file    Attends Club or Organization Meetings: Not on file    Marital Status: Not on file   Intimate Partner Violence:     Fear of Current or Ex-Partner: Not on file    Emotionally Abused: Not on file    Physically Abused: Not on file    Sexually Abused: Not on file   Housing Stability:     Unable to Pay for Housing in the Last Year: Not on file    Number of Places Lived in the Last Year: Not on file    Unstable Housing in the Last Year: Not on file       FHX:   History reviewed. No pertinent family history. Allergy:   No Known Allergies    Patient Active Problem List   Diagnosis Code    [facility] F10.239    DEEPTHI (acute kidney injury) ([facility] N17.9    Alcohol abuse F10.10       Home Medications:     Medications Prior to Admission   Medication Sig    ondansetron (Zofran ODT) 4 mg disintegrating tablet 1 Tablet by SubLINGual route every eight (8) hours as needed for Nausea or Vomiting.  ibuprofen (MOTRIN) 600 mg tablet Take 1 Tab by mouth every six (6) hours as needed for Pain. Review of Systems:     Constitutional: No fevers, chills, weight loss, fatigue. Skin: No rashes, pruritis, jaundice, ulcerations, erythema. HENT: No headaches, nosebleeds, sinus pressure, rhinorrhea, sore throat. Eyes: No visual changes, blurred vision, eye pain, photophobia, jaundice. Cardiovascular: No chest pain, heart palpitations. Respiratory: No cough, SOB, wheezing, chest discomfort, orthopnea. Gastrointestinal: Neg unless noted otherwise in H&P   Genitourinary: No dysuria, bleeding, discharge, pyuria. Musculoskeletal: No weakness, arthralgias, wasting. Endo: No sweats. Heme: No bruising, easy bleeding. Allergies: As noted. Neurological: Cranial nerves intact. Alert and oriented. Gait not assessed. Psychiatric:  No anxiety, depression, hallucinations. Visit Vitals  BP (!) 131/100   Pulse (!) 103   Temp 98.4 °F (36.9 °C)   Resp 15   Ht 5' 10.87\" (1.8 m)   Wt 61.2 kg (135 lb)   SpO2 98%   BMI 18.90 kg/m²       Physical Assessment:     constitutional: appearance: thin build, in no acute distress. skin: inspection: no rashes, ulcers, icterus or other lesions; no clubbing or telangiectasias. palpation: no induration or subcutaneos nodules.    eyes: inspection: normal conjunctivae and lids; no jaundice pupils: normal  ENMT: mouth: normal oral mucosa,lips and gums; good dentition. oropharynx: normal tongue, hard and soft palate; posterior pharynx without erithema, exudate or lesions. neck: thyroid: normal size, consistency and position; no masses or tenderness. respiratory: effort: normal chest excursion; no intercostal retraction or accessory muscle use. cardiovascular: abdominal aorta: normal size and position; no bruits. palpation: PMI of normal size and position; normal rhythm; no thrill or murmurs. abdominal: abdomen: normal consistency; no tenderness or masses. hernias: no hernias appreciated. liver: normal size and consistency. spleen: not palpable. rectal: hemoccult/guaiac: not performed. musculoskeletal: digits and nails: no clubbing, cyanosis, petechiae or other inflammatory conditions. head and neck: normal range of motion; no pain, crepitation or contracture. spine/ribs/pelvis: normal range of motion; no pain, deformity or contracture. neurologic: cranial nerves: II-XII normal. Pupils intact. psychiatric: judgement/insight: within normal limits. memory: within normal limits for recent and remote events. mood and affect: no evidence of depression, anxiety or agitation. orientation: oriented to time, space and person. Basic Metabolic Profile   Recent Labs     06/17/22 0449      K 3.6   *   CO2 23   BUN 27*   *   CA 9.0         CBC w/Diff    Recent Labs     06/17/22 0449   WBC 5.1   RBC 3.20*   HGB 10.4*   HCT 28.4*   MCV 88.8   MCH 32.5   MCHC 36.6   RDW 12.8   PLT 21*    Recent Labs     06/17/22 0449   GRANS 82*   LYMPH 10*   EOS 2        Hepatic Function   Recent Labs     06/17/22 0449   ALB 3.0*   TP 6.0*   TBILI 2.3*   *        Coags   Recent Labs     06/17/22 0449 06/16/22  2155   PTP 17.4* 18.3*   INR 1.4* 1.5*           SHANTELL Dave.    [facility] [address]  Cell: [phone]  [email or web address]

## 2022-06-17 NOTE — PROGRESS NOTES
1930 Bedside and Verbal shift change  Received from Emma Welch RN (outgoing nurse), to L. Jennine Duverney (oncoming)  Pt. Is AOX 4. IV patent and infusing well, Pt. denies  pain at this time. Report included the following information SBAR, Kardex, Procedure Summary, Intake/Output, MAR, Recent Lab Results, and  Cardiac Rhythm @ SR. Will resume care and monitor Pt. Condition. Pt. Educated on call bell when in need of help and assistance. Pt. verbalized understanding. Bed alarm on. Family in room. Pt. Head to toe Assessment Done and documented. 2030  Pt in bed, denies discomfort. 2130  Pt. Denies discomfort. 2245  Pt. Resting comfortably in bed. 0000  Pt. Educated on NPO status. Pt. Verbalized understandng. 0200  Pt. Made no complaints. 0400  Pt. Denies discomfort.    0600 Pt. Able to rest and sleep well throughout the shift. Verbal and bedside Shift changed report given to Wilfred Masterson RN (oncoming RN) on Pt. Condition. Report consisted of patients Situation, History, Activities, intake/output,  Background, Assessment and Recommendations(SBAR). Information from the following report(s) Kardex, order Summary, Lab results and MAR was reviewed with the receiving nurse. Opportunity for questions and clarification was provided.

## 2022-06-17 NOTE — PROGRESS NOTES
Problem: Patient Education: Go to Patient Education Activity  Goal: Patient/Family Education  Outcome: Progressing Towards Goal     Problem: Acute Renal Failure: Day 1  Goal: Activity/Safety  Outcome: Progressing Towards Goal  Goal: Consults, if ordered  Outcome: Progressing Towards Goal  Goal: Diagnostic Test/Procedures  Outcome: Progressing Towards Goal  Goal: Nutrition/Diet  Outcome: Progressing Towards Goal  Goal: Discharge Planning  Outcome: Progressing Towards Goal  Goal: Medications  Outcome: Progressing Towards Goal  Goal: Respiratory  Outcome: Progressing Towards Goal  Goal: Treatments/Interventions/Procedures  Outcome: Progressing Towards Goal  Goal: Psychosocial  Outcome: Progressing Towards Goal  Goal: *Optimal pain control at patient's stated goal  Outcome: Progressing Towards Goal  Goal: *Urinary output within identified parameters  Outcome: Progressing Towards Goal  Goal: *Hemodynamically stable  Outcome: Progressing Towards Goal  Goal: *Tolerating diet  Outcome: Progressing Towards Goal

## 2022-06-17 NOTE — PROGRESS NOTES
Problem: Patient Education: Go to Patient Education Activity  Goal: Patient/Family Education  Outcome: Progressing Towards Goal     Problem: Acute Renal Failure: Day 1  Goal: Activity/Safety  Outcome: Progressing Towards Goal  Goal: Consults, if ordered  Outcome: Progressing Towards Goal  Goal: Diagnostic Test/Procedures  Outcome: Progressing Towards Goal  Goal: Nutrition/Diet  Outcome: Progressing Towards Goal  Goal: Discharge Planning  Outcome: Progressing Towards Goal  Goal: Medications  Outcome: Progressing Towards Goal  Goal: Respiratory  Outcome: Progressing Towards Goal  Goal: Treatments/Interventions/Procedures  Outcome: Progressing Towards Goal  Goal: Psychosocial  Outcome: Progressing Towards Goal  Goal: *Optimal pain control at patient's stated goal  Outcome: Progressing Towards Goal  Goal: *Urinary output within identified parameters  Outcome: Progressing Towards Goal  Goal: *Hemodynamically stable  Outcome: Progressing Towards Goal  Goal: *Tolerating diet  Outcome: Progressing Towards Goal     Problem: Acute Renal Failure: Day 2  Goal: Off Pathway (Use only if patient is Off Pathway)  Outcome: Progressing Towards Goal  Goal: Activity/Safety  Outcome: Progressing Towards Goal  Goal: Consults, if ordered  Outcome: Progressing Towards Goal  Goal: Diagnostic Test/Procedures  Outcome: Progressing Towards Goal  Goal: Nutrition/Diet  Outcome: Progressing Towards Goal  Goal: Discharge Planning  Outcome: Progressing Towards Goal  Goal: Medications  Outcome: Progressing Towards Goal  Goal: Respiratory  Outcome: Progressing Towards Goal  Goal: Treatments/Interventions/Procedures  Outcome: Progressing Towards Goal  Goal: Psychosocial  Outcome: Progressing Towards Goal  Goal: *Optimal pain control at patient's stated goal  Outcome: Progressing Towards Goal  Goal: *Urinary output within identified parameters  Outcome: Progressing Towards Goal  Goal: *Hemodynamically stable  Outcome: Progressing Towards Goal  Goal: *Tolerating diet  Outcome: Progressing Towards Goal  Goal: *Lab values improving  Outcome: Progressing Towards Goal     Problem: Acute Renal Failure: Day 3  Goal: Off Pathway (Use only if patient is Off Pathway)  Outcome: Progressing Towards Goal  Goal: Activity/Safety  Outcome: Progressing Towards Goal  Goal: Consults, if ordered  Outcome: Progressing Towards Goal  Goal: Diagnostic Test/Procedures  Outcome: Progressing Towards Goal  Goal: Nutrition/Diet  Outcome: Progressing Towards Goal  Goal: Discharge Planning  Outcome: Progressing Towards Goal  Goal: Medications  Outcome: Progressing Towards Goal  Goal: Respiratory  Outcome: Progressing Towards Goal  Goal: Treatments/Interventions/Procedures  Outcome: Progressing Towards Goal  Goal: Psychosocial  Outcome: Progressing Towards Goal  Goal: *Optimal pain control at patient's stated goal  Outcome: Progressing Towards Goal  Goal: *Urinary output within identified parameters  Outcome: Progressing Towards Goal  Goal: *Hemodynamically stable  Outcome: Progressing Towards Goal  Goal: *Tolerating diet  Outcome: Progressing Towards Goal  Goal: *Lab values improving  Outcome: Progressing Towards Goal     Problem: Acute Renal Failure: Day 4  Goal: Off Pathway (Use only if patient is Off Pathway)  Outcome: Progressing Towards Goal  Goal: Activity/Safety  Outcome: Progressing Towards Goal  Goal: Consults, if ordered  Outcome: Progressing Towards Goal  Goal: Diagnostic Test/Procedures  Outcome: Progressing Towards Goal  Goal: Nutrition/Diet  Outcome: Progressing Towards Goal  Goal: Discharge Planning  Outcome: Progressing Towards Goal  Goal: Medications  Outcome: Progressing Towards Goal  Goal: Respiratory  Outcome: Progressing Towards Goal  Goal: Treatments/Interventions/Procedures  Outcome: Progressing Towards Goal  Goal: Psychosocial  Outcome: Progressing Towards Goal  Goal: *Optimal pain control at patient's stated goal  Outcome: Progressing Towards Goal  Goal: *Urinary output within identified parameters  Outcome: Progressing Towards Goal  Goal: *Hemodynamically stable  Outcome: Progressing Towards Goal  Goal: *Tolerating diet  Outcome: Progressing Towards Goal  Goal: *Lab values improving  Outcome: Progressing Towards Goal     Problem: Acute Renal Failure: Day 5  Goal: Off Pathway (Use only if patient is Off Pathway)  Outcome: Progressing Towards Goal  Goal: Activity/Safety  Outcome: Progressing Towards Goal  Goal: Diagnostic Test/Procedures  Outcome: Progressing Towards Goal  Goal: Nutrition/Diet  Outcome: Progressing Towards Goal  Goal: Discharge Planning  Outcome: Progressing Towards Goal  Goal: Medications  Outcome: Progressing Towards Goal  Goal: Respiratory  Outcome: Progressing Towards Goal  Goal: Treatments/Interventions/Procedures  Outcome: Progressing Towards Goal  Goal: Psychosocial  Outcome: Progressing Towards Goal  Goal: *Optimal pain control at patient's stated goal  Outcome: Progressing Towards Goal  Goal: *Urinary output within identified parameters  Outcome: Progressing Towards Goal  Goal: *Hemodynamically stable  Outcome: Progressing Towards Goal  Goal: *Tolerating diet  Outcome: Progressing Towards Goal  Goal: *Lab values improving  Outcome: Progressing Towards Goal     Problem: Acute Renal Failure: Day 6  Goal: Off Pathway (Use only if patient is Off Pathway)  Outcome: Progressing Towards Goal  Goal: Activity/Safety  Outcome: Progressing Towards Goal  Goal: Diagnostic Test/Procedures  Outcome: Progressing Towards Goal  Goal: Nutrition/Diet  Outcome: Progressing Towards Goal  Goal: Discharge Planning  Outcome: Progressing Towards Goal  Goal: Medications  Outcome: Progressing Towards Goal  Goal: Respiratory  Outcome: Progressing Towards Goal  Goal: Treatments/Interventions/Procedures  Outcome: Progressing Towards Goal  Goal: Psychosocial  Outcome: Progressing Towards Goal     Problem: Acute Renal Failure: Discharge Outcomes  Goal: *Optimal pain control at patient's stated goal  Outcome: Progressing Towards Goal  Goal: *Urinary output within identified parameters  Outcome: Progressing Towards Goal  Goal: *Hemodynamically stable  Outcome: Progressing Towards Goal  Goal: *Tolerating diet  Outcome: Progressing Towards Goal  Goal: *Lab values stabilized  Outcome: Progressing Towards Goal  Goal: *Verbalizes understanding and describes medication purposes and frequencies  Outcome: Progressing Towards Goal  Goal: *Medication reconciliation  Outcome: Progressing Towards Goal

## 2022-06-18 ENCOUNTER — HOSPITAL ENCOUNTER (EMERGENCY)
Age: 47
Discharge: HOME | End: 2022-06-18
Attending: STUDENT IN AN ORGANIZED HEALTH CARE EDUCATION/TRAINING PROGRAM
Payer: MEDICAID

## 2022-06-18 VITALS
TEMPERATURE: 98.4 F | RESPIRATION RATE: 18 BRPM | BODY MASS INDEX: 18.9 KG/M2 | WEIGHT: 135 LBS | DIASTOLIC BLOOD PRESSURE: 96 MMHG | OXYGEN SATURATION: 97 % | HEIGHT: 71 IN | SYSTOLIC BLOOD PRESSURE: 145 MMHG | HEART RATE: 73 BPM

## 2022-06-18 DIAGNOSIS — R79.89 ELEVATED LFTS: Primary | ICD-10-CM

## 2022-06-18 LAB
ALBUMIN SERPL-MCNC: 3 G/DL (ref 3.4–5)
ALBUMIN/GLOB SERPL: 0.9 {RATIO} (ref 0.8–1.7)
ALP SERPL-CCNC: 182 U/L (ref 45–117)
ALT SERPL-CCNC: 1242 U/L (ref 16–61)
ANION GAP SERPL CALC-SCNC: 6 MMOL/L (ref 3–18)
AST SERPL-CCNC: 1263 U/L (ref 10–38)
BASOPHILS # BLD: 0 K/UL (ref 0–0.1)
BASOPHILS NFR BLD: 1 % (ref 0–2)
BILIRUB SERPL-MCNC: 2.6 MG/DL (ref 0.2–1)
BUN SERPL-MCNC: 17 MG/DL (ref 7–18)
BUN/CREAT SERPL: 17 (ref 12–20)
CALCIUM SERPL-MCNC: 9.7 MG/DL (ref 8.5–10.1)
CHLORIDE SERPL-SCNC: 110 MMOL/L (ref 100–111)
CO2 SERPL-SCNC: 24 MMOL/L (ref 21–32)
CREAT SERPL-MCNC: 1 MG/DL (ref 0.6–1.3)
DIFFERENTIAL METHOD BLD: ABNORMAL
EOSINOPHIL # BLD: 0 K/UL (ref 0–0.4)
EOSINOPHIL NFR BLD: 1 % (ref 0–5)
ERYTHROCYTE [DISTWIDTH] IN BLOOD BY AUTOMATED COUNT: 12.8 % (ref 11.6–14.5)
GLOBULIN SER CALC-MCNC: 3.3 G/DL (ref 2–4)
GLUCOSE SERPL-MCNC: 102 MG/DL (ref 74–99)
HCT VFR BLD AUTO: 28.4 % (ref 36–48)
HGB BLD-MCNC: 10.6 G/DL (ref 13–16)
IMM GRANULOCYTES # BLD AUTO: 0 K/UL
IMM GRANULOCYTES NFR BLD AUTO: 0 %
INR PPP: 1.3 (ref 0.8–1.2)
LYMPHOCYTES # BLD: 0.8 K/UL (ref 0.9–3.6)
LYMPHOCYTES NFR BLD: 19 % (ref 21–52)
MCH RBC QN AUTO: 33 PG (ref 24–34)
MCHC RBC AUTO-ENTMCNC: 37.3 G/DL (ref 31–37)
MCV RBC AUTO: 88.5 FL (ref 78–100)
MONOCYTES # BLD: 0.2 K/UL (ref 0.05–1.2)
MONOCYTES NFR BLD: 4 % (ref 3–10)
NEUTS BAND NFR BLD MANUAL: 2 % (ref 0–5)
NEUTS SEG # BLD: 3.1 K/UL (ref 1.8–8)
NEUTS SEG NFR BLD: 73 % (ref 40–73)
NRBC # BLD: 0 K/UL (ref 0–0.01)
NRBC BLD-RTO: 0 PER 100 WBC
PLATELET # BLD AUTO: 28 K/UL (ref 135–420)
PLATELET COMMENTS,PCOM: ABNORMAL
PMV BLD AUTO: 14 FL (ref 9.2–11.8)
POTASSIUM SERPL-SCNC: 3.4 MMOL/L (ref 3.5–5.5)
PROT SERPL-MCNC: 6.3 G/DL (ref 6.4–8.2)
PROTHROMBIN TIME: 16.5 SEC (ref 11.5–15.2)
RBC # BLD AUTO: 3.21 M/UL (ref 4.35–5.65)
RBC MORPH BLD: ABNORMAL
SODIUM SERPL-SCNC: 140 MMOL/L (ref 136–145)
WBC # BLD AUTO: 4.1 K/UL (ref 4.6–13.2)

## 2022-06-18 PROCEDURE — 85610 PROTHROMBIN TIME: CPT

## 2022-06-18 PROCEDURE — 99232 SBSQ HOSP IP/OBS MODERATE 35: CPT | Performed by: INTERNAL MEDICINE

## 2022-06-18 PROCEDURE — 85025 COMPLETE CBC W/AUTO DIFF WBC: CPT

## 2022-06-18 PROCEDURE — 36415 COLL VENOUS BLD VENIPUNCTURE: CPT

## 2022-06-18 PROCEDURE — 74011000250 HC RX REV CODE- 250: Performed by: HOSPITALIST

## 2022-06-18 PROCEDURE — 99282 EMERGENCY DEPT VISIT SF MDM: CPT

## 2022-06-18 PROCEDURE — 74011250637 HC RX REV CODE- 250/637: Performed by: HOSPITALIST

## 2022-06-18 PROCEDURE — 80053 COMPREHEN METABOLIC PANEL: CPT

## 2022-06-18 RX ADMIN — SODIUM CHLORIDE, PRESERVATIVE FREE 10 ML: 5 INJECTION INTRAVENOUS at 07:13

## 2022-06-18 RX ADMIN — FOLIC ACID 1 MG: 1 TABLET ORAL at 08:00

## 2022-06-18 RX ADMIN — Medication 100 MG: at 08:00

## 2022-06-18 RX ADMIN — THERA TABS 1 TABLET: TAB at 08:00

## 2022-06-18 NOTE — PROGRESS NOTES
Reason for Admission:  [facility] ([facility] [F10.239]  DEEPTHI (acute kidney injury) ([facility] [N17.9]  Alcohol abuse [F10.10]                 RUR Score:    10           Plan for utilizing home health:    No                       Likelihood of Readmission:   LOW                         Transition of Care Plan:              Initial assessment completed with patient. Cognitive status of patient: oriented to time, place, person and situation. Face sheet information confirmed:  yes. The patient designates his mother Silvia Parent  to participate in his discharge plan and to receive any needed information. This patient lives in a home with mother and daughter. Patient is able to navigate steps as needed. Prior to hospitalization, patient was considered to be independent with ADLs/IADLS : yes . Patient has a current ACP document on file: no      Healthcare Decision Maker:     Click here to complete [address] including selection of the Healthcare Decision Maker Relationship (ie \"Primary\")    The mother will be available to transport patient home upon discharge. The patient already has none reported  medical equipment available in the home. Patient is not currently active with home health. Patient has not stayed in a skilled nursing facility or rehab. Was  stay within last 60 days : no. This patient is on dialysis :no   Currently, the discharge plan is Home. The patient states that he can obtain his medications from the pharmacy, and take his medications as directed. Patient's current insurance is Medicaid       Care Management Interventions  PCP Verified by CM: Yes  Palliative Care Criteria Met (RRAT>21 & CHF Dx)?: No  Mode of Transport at Discharge:  Other (see comment) (family)  Transition of Care Consult (CM Consult): Discharge Planning  Support Systems: Parent(s),Child(amelie)  Confirm Follow Up Transport: Self  Discharge Location  Patient Expects to be Discharged to[de-identified] Home        KRYSTAL VenegasN RN  Care Management  Pager: [phone]

## 2022-06-18 NOTE — PROGRESS NOTES
Progress Note  Hospitalist Service    Patient: Charline Quiñones MRN: [id number]   SSN: [id number]  YOB: 1975   Age: 41 y.o. Sex: male      Admit Date: 6/15/2022    LOS: 2 days   Chief Complaint   Patient presents with    Nausea       Subjective:     Patient interviewed with daughter at bedside. Updated his mother by phone. Patient ambulating in room. Appears much improved. No abdominal pain. No signs of withdrawal.     Review of Systems:  Patient Endorses   ---------------------------------------------------------------------------------  Constitutional: Negative for fever, chills and diaphoresis. HENT: Negative for hearing loss, +sore throat. Eyes: Negative for blurred vision and double vision. Respiratory: Negative for cough   Cardiovascular: Negative for chest pain and palpitations. Gastrointestinal: +nausea, no vomiting. .   Musculoskeletal: Negative for myalgias    Skin: Negative for itching, jaundice   Neurological: Negative for dizziness and headaches. Psychiatric: Negative for depression and suicidal ideas. Objective:     Vitals:  Visit Vitals  BP (!) 134/95 (BP 1 Location: Left upper arm, BP Patient Position: At rest)   Pulse 84   Temp 98.3 °F (36.8 °C)   Resp 18   Ht 5' 10.87\" (1.8 m)   Wt 61.2 kg (135 lb)   SpO2 97%   BMI 18.90 kg/m²       Physical Exam:   Gen: thin, chronically ill appearing. HEENT: Sclerae nonicteric. Oral mucous membranes dry, dentition poor  Neck: Supple with midline trachea. CV: RRR without murmur or rub appreciated. Resp: CTA bilaterally, no wheezing. No pain with inspiration. Abd: Soft, nontender, nondistended. Extrem: Extremities are warm, without cyanosis or clubbing. No pitting pretibial edema. Skin: Warm, no visible rashes. Neuro: Patient is alert, oriented, and cooperative. No obvious focal defects. Moves all 4 extremities. Intake and Output:  Current Shift: No intake/output data recorded.   Last three shifts: 06/16 0701 - 06/17 1900  In: 2212.9 [P.O.:840; I.V.:1372.9]  Out: 4650 [Urine:4650]    Lab/Data Review:  No results found for this or any previous visit (from the past 12 hour(s)). Key Findings or tests:       Telemetry NONE   Oxygen NONE     Assessment and Plan:     1) Transaminitis: Upon admission - patient with AST of 369, . These have risen to AST to 11,567 and ALT of 2,797. Patient continues to be AOx4. INR at 1.3 initially. DDx to include Fulminant viral hepatitis, shock liver, DILI. LFTs improved greatly. Utox negative. Liver doppler without concerning findings. Hep panel negative. #1: Patient on stepdown. INR daily, Tylenol level pending. Would like to see LFTs decline another day before discharge. Per GI - upon d/c - will need screening EGD to look for varices. Needs initial colonoscopy. 2) DEEPTHI: Cr of 2.90 initially. Now improved to 1.52. #2. D/c IVFs; patient awake/alert and eating. 3) Thrombocytopenia    #3) likely secondary to alcoholic liver disease. Hold DVT ppx.   4) Hyperkalemia    #4. Now normalized on BMP. 5) H/o of Alcohol abuse    #5. On CIWA. Continue thiamine, folate. Counseled patient on checking into alcohol cessation program. Patient has been in 3 previous rehabs.      Diet Regular    DVT Prophylax SCDs   GI Prophylaxis    Code status Full    Disposition TBD on clinical course         Efren Robison DO, hospitalist   June 17, 2022

## 2022-06-18 NOTE — DISCHARGE SUMMARY
Patient was seen in presence of nursing. Patient was stressed up and stated he would like to go home. He is alert oriented x4. He knows where he lives, he knows his date of birth, he knows his PCPs name and home address. He states he needs to go to complete some business and can come back to ED on Monday if needed. He remembers seeing GI doctor and his recommendations to continue monitoring in the hospital.  However, Patient has decided to leave the hospital against medical advice. The patient is competent and understands the risks of leaving, including permanent disability and/or death, and was given an opportunity to ask questions about his condition. I informed him he can continue taking folic acid and multivitamin. I also informed him to stay away from alcohol. The patient has been informed that he may return for care at any time otherwise he can follow-up with his PCP. Please refer to my progress note for further detail. Patient was treated here for abnormal liver function test, DEEPTHI, thrombocytopenia and alcohol use disorder.

## 2022-06-18 NOTE — PROGRESS NOTES
1905 Bedside and Verbal shift change  Received from SCOTTIE Jeronimo (outgoing nurse), to MERLYN Casillas (oncoming)  Pt. Is AOX e. IV Sl, Pt. denies  pain at this time. Report included the following information SBAR, Kardex, Procedure Summary, Intake/Output, MAR, Recent Lab Results, and  Cardiac Rhythm @ verbalized. Will resume care and monitor Pt. Condition. Pt. Educated on call bell when in need of help and assistance. Pt. verbalized understanding. 1930  Pt. Head to toe Assessment Done and documented. 2030  Pt. Made no complaints. 2145 Pt. Voided cyu large amount. 2300  Pt. Eating snacks. 0000  Pt. Resting in bed, not in distress. 0200 Pt. Denies pain. 0400  Pt. Not in distress. 0600  Pt. Able to rest and sleep well throughout the shift. Verbal and bedside Shift changed report given to Hermila Marc RN (oncoming RN) on Pt. Condition. Report consisted of patients Situation, History, Activities, intake/output,  Background, Assessment and Recommendations(SBAR). Information from the following report(s) Kardex, order Summary, Lab results and MAR was reviewed with the receiving nurse. Opportunity for questions and clarification was provided.

## 2022-06-18 NOTE — PROGRESS NOTES
Progress Note  Hospitalist Service    Patient: Bridgett Rutherford MRN: [id number]   SSN: [id number]  YOB: 1975   Age: 41 y.o. Sex: male      Admit Date: 6/15/2022    LOS: 3 days   Chief Complaint   Patient presents with    Nausea       Subjective:     Patient interviewed in presence of nursing. Patient is sitting up and dressed up. He says he would like to go home. He says he he lives with his mother. His daughter is always there. He denies any pain anywhere today. No nausea or vomiting. He says he has been walking in room without any issues. No headaches or dizziness. No shortness of breath or cough. He stated he sees Dr. Jeannie Ruano as his PCP and he knows his date of birth as well as home address. He also remembers taking folic acid at home. He says he has to go home today as he has to take care of some business and can come back to the ED if needed. Objective:     Vitals:  Visit Vitals  BP (!) 145/96 (BP 1 Location: Left upper arm, BP Patient Position: Lying)   Pulse 73   Temp 98.4 °F (36.9 °C)   Resp 18   Ht 5' 10.87\" (1.8 m)   Wt 61.2 kg (135 lb)   SpO2 97%   BMI 18.90 kg/m²       Physical Exam:   Gen: Thin, follows verbal commands appropriately, no tremors noted currently. Awake. Not in acute distress. HEENT: Sclerae nonicteric. Oral mucous membranes dry, dentition poor  CV: RRR  Resp: CTA bilaterally, no wheezing. Abd: Soft, nontender, nondistended. Sounds present. Extrem:   No pitting pretibial edema. Neuro: Patient is alert, oriented, and cooperative. No obvious focal defects. Moves all 4 extremities.     Intake and Output:  Current Shift: 06/18 0701 - 06/18 1900  In: 240 [P.O.:240]  Out: 300 [Urine:300]  Last three shifts: 06/16 1901 - 06/18 0700  In: 1680 [P.O.:1680]  Out: 4900 [Urine:4900]    Lab/Data Review:  Recent Results (from the past 12 hour(s))   CBC WITH AUTOMATED DIFF    Collection Time: 06/18/22  4:48 AM   Result Value Ref Range    WBC 4.1 (L) 4.6 - 13.2 K/uL RBC 3.21 (L) 4.35 - 5.65 M/uL    HGB 10.6 (L) 13.0 - 16.0 g/dL    HCT 28.4 (L) 36.0 - 48.0 %    MCV 88.5 78.0 - 100.0 FL    MCH 33.0 24.0 - 34.0 PG    MCHC 37.3 (H) 31.0 - 37.0 g/dL    RDW 12.8 11.6 - 14.5 %    PLATELET 28 (LL) 703 - 420 K/uL    MPV 14.0 (H) 9.2 - 11.8 FL    NRBC 0.0 0  WBC    ABSOLUTE NRBC 0.00 0.00 - 0.01 K/uL    NEUTROPHILS 73 40 - 73 %    BAND NEUTROPHILS 2 0 - 5 %    LYMPHOCYTES 19 (L) 21 - 52 %    MONOCYTES 4 3 - 10 %    EOSINOPHILS 1 0 - 5 %    BASOPHILS 1 0 - 2 %    IMMATURE GRANULOCYTES 0 %    ABS. NEUTROPHILS 3.1 1.8 - 8.0 K/UL    ABS. LYMPHOCYTES 0.8 (L) 0.9 - 3.6 K/UL    ABS. MONOCYTES 0.2 0.05 - 1.2 K/UL    ABS. EOSINOPHILS 0.0 0.0 - 0.4 K/UL    ABS. BASOPHILS 0.0 0.0 - 0.1 K/UL    ABS. IMM. GRANS. 0.0 K/UL    DF MANUAL      PLATELET COMMENTS DECREASED PLATELETS      RBC COMMENTS TARGET CELLS  2+       METABOLIC PANEL, COMPREHENSIVE    Collection Time: 06/18/22  4:48 AM   Result Value Ref Range    Sodium 140 136 - 145 mmol/L    Potassium 3.4 (L) 3.5 - 5.5 mmol/L    Chloride 110 100 - 111 mmol/L    CO2 24 21 - 32 mmol/L    Anion gap 6 3.0 - 18 mmol/L    Glucose 102 (H) 74 - 99 mg/dL    BUN 17 7.0 - 18 MG/DL    Creatinine 1.00 0.6 - 1.3 MG/DL    BUN/Creatinine ratio 17 12 - 20      GFR est AA >60 >60 ml/min/1.73m2    GFR est non-AA >60 >60 ml/min/1.73m2    Calcium 9.7 8.5 - 10.1 MG/DL    Bilirubin, total 2.6 (H) 0.2 - 1.0 MG/DL    ALT (SGPT) 1,242 (H) 16 - 61 U/L    AST (SGOT) 1,263 (H) 10 - 38 U/L    Alk.  phosphatase 182 (H) 45 - 117 U/L    Protein, total 6.3 (L) 6.4 - 8.2 g/dL    Albumin 3.0 (L) 3.4 - 5.0 g/dL    Globulin 3.3 2.0 - 4.0 g/dL    A-G Ratio 0.9 0.8 - 1.7     PROTHROMBIN TIME + INR    Collection Time: 06/18/22  4:48 AM   Result Value Ref Range    Prothrombin time 16.5 (H) 11.5 - 15.2 sec    INR 1.3 (H) 0.8 - 1.2           Key Findings or tests:       Telemetry NONE   Oxygen NONE     Assessment and Plan:     1) Transaminitis: Upon admission - patient with AST of 369, . These have risen to AST to 11,567 and ALT of 2,797. Patient continues to be AOx4. INR at 1.3 initially and remained stable at 1.3 today. DDx to include Fulminant viral hepatitis, shock liver, DILI. LFTs are downtrending with AST of 1263 and ALT of 1242. Utox negative. Liver doppler without concerning findings. Hep panel negative. Normal Tylenol level. Discussed with GI and they would like to keep patient here to monitor liver function test as well as blood count. 2) DEEPTHI: Resolved. Cr of 2.90 initially. Now improved to 1.00.    3) Thrombocytopenia: likely secondary to alcoholic liver disease. Hold DVT ppx. Slightly uptrending. Risk of fall causing intracranial hemorrhage secondary to thrombocytopenia was discussed with patient. He verbalized understanding about it. 4) Hyperkalemia resolved  5) H/o of Alcohol abuse: On CIWA. Continue thiamine, folate. Counseled patient on checking into alcohol cessation program. Patient has been in 3 previous rehabs. He was advised to stay away from alcohol to prevent fall. He verbalized understanding about it    Diet Regular    DVT Prophylax SCDs   GI Prophylaxis    Code status Full    Disposition TBD on clinical course      Total time to take care of this patient was 25 minutes and more than 50% of time was spent counseling and coordinating care. Lilliana Frazier MD, hospitalist   June 18, 2022      Disclaimer: Sections of this note are dictated using utilizing voice recognition software, which may have resulted in some phonetic based errors in grammar and contents. Even though attempts were made to correct all the mistakes, some may have been missed, and remained in the body of the document. If questions arise, please contact our department.

## 2022-06-18 NOTE — ED PROVIDER NOTES
EMERGENCY DEPARTMENT HISTORY AND PHYSICAL EXAM    2:27 PM      Date: (Not on file)  Patient Name: Vane Werner    History of Presenting Illness     No chief complaint on file. History Provided By: Patient  Location/Duration/Severity/Modifying factors   Patient is a 62-year-old male with a history of alcohol abuse, liver disease, and alcohol withdrawal presenting due to recent discharge from hospital abnormal labs. Patient was admitted by me to the hospital few days ago secondary to nausea vomiting and alcohol withdrawal with DEEPTHI, elevated liver enzymes. Patient reports back to the emergency department today because he states that he was supposed to get discharge today from the hospital but did not want to wait for paper so left AMA. Patient currently without any complaints or symptoms. PCP: Ely Rich NP    Current Outpatient Medications   Medication Sig Dispense Refill    ondansetron (Zofran ODT) 4 mg disintegrating tablet 1 Tablet by SubLINGual route every eight (8) hours as needed for Nausea or Vomiting. 20 Tablet 0    ibuprofen (MOTRIN) 600 mg tablet Take 1 Tab by mouth every six (6) hours as needed for Pain. 20 Tab 0       Past History     Past Medical History:  Past Medical History:   Diagnosis Date    Glaucoma        Past Surgical History:  Past Surgical History:   Procedure Laterality Date    HX HEENT      glaucoma left eye       Family History:  No family history on file. Social History:  Social History     Tobacco Use    Smoking status: Current Every Day Smoker    Smokeless tobacco: Never Used   Substance Use Topics    Alcohol use: Yes    Drug use: No       Allergies:  No Known Allergies      Review of Systems       Review of Systems   Constitutional: Negative for activity change, fatigue and fever. HENT: Negative for congestion and rhinorrhea. Eyes: Negative for visual disturbance. Respiratory: Negative for shortness of breath.     Cardiovascular: Negative for chest pain and palpitations. Gastrointestinal: Negative for abdominal pain, diarrhea, nausea and vomiting. Genitourinary: Negative for dysuria and hematuria. Musculoskeletal: Negative for back pain. Skin: Negative for rash. Neurological: Negative for dizziness, weakness and light-headedness. All other systems reviewed and are negative. Physical Exam   There were no vitals taken for this visit. Physical Exam  Vitals and nursing note reviewed. Constitutional:       General: He is not in acute distress. Appearance: He is well-developed. He is not ill-appearing. HENT:      Head: Normocephalic and atraumatic. Right Ear: External ear normal.      Left Ear: External ear normal.      Nose: Nose normal.   Cardiovascular:      Rate and Rhythm: Normal rate and regular rhythm. Heart sounds: Normal heart sounds. No murmur heard. No friction rub. No gallop. Pulmonary:      Effort: Pulmonary effort is normal.      Breath sounds: Normal breath sounds. Chest:      Chest wall: No tenderness. Abdominal:      General: Bowel sounds are normal. There is no distension. Palpations: Abdomen is soft. Tenderness: There is no abdominal tenderness. There is no guarding. Musculoskeletal:         General: No tenderness. Normal range of motion. Cervical back: Normal range of motion and neck supple. Skin:     General: Skin is warm and dry. Neurological:      General: No focal deficit present. Mental Status: He is alert and oriented to person, place, and time. Sensory: No sensory deficit. Motor: No weakness. Coordination: Coordination normal.   Psychiatric:         Behavior: Behavior normal.         Thought Content:  Thought content normal.         Judgment: Judgment normal.           Diagnostic Study Results     Labs -  Recent Results (from the past 12 hour(s))   CBC WITH AUTOMATED DIFF    Collection Time: 06/18/22  4:48 AM   Result Value Ref Range    WBC 4.1 (L) 4.6 - 13.2 K/uL    RBC 3.21 (L) 4.35 - 5.65 M/uL    HGB 10.6 (L) 13.0 - 16.0 g/dL    HCT 28.4 (L) 36.0 - 48.0 %    MCV 88.5 78.0 - 100.0 FL    MCH 33.0 24.0 - 34.0 PG    MCHC 37.3 (H) 31.0 - 37.0 g/dL    RDW 12.8 11.6 - 14.5 %    PLATELET 28 (LL) 973 - 420 K/uL    MPV 14.0 (H) 9.2 - 11.8 FL    NRBC 0.0 0  WBC    ABSOLUTE NRBC 0.00 0.00 - 0.01 K/uL    NEUTROPHILS 73 40 - 73 %    BAND NEUTROPHILS 2 0 - 5 %    LYMPHOCYTES 19 (L) 21 - 52 %    MONOCYTES 4 3 - 10 %    EOSINOPHILS 1 0 - 5 %    BASOPHILS 1 0 - 2 %    IMMATURE GRANULOCYTES 0 %    ABS. NEUTROPHILS 3.1 1.8 - 8.0 K/UL    ABS. LYMPHOCYTES 0.8 (L) 0.9 - 3.6 K/UL    ABS. MONOCYTES 0.2 0.05 - 1.2 K/UL    ABS. EOSINOPHILS 0.0 0.0 - 0.4 K/UL    ABS. BASOPHILS 0.0 0.0 - 0.1 K/UL    ABS. IMM. GRANS. 0.0 K/UL    DF MANUAL      PLATELET COMMENTS DECREASED PLATELETS      RBC COMMENTS TARGET CELLS  2+       METABOLIC PANEL, COMPREHENSIVE    Collection Time: 06/18/22  4:48 AM   Result Value Ref Range    Sodium 140 136 - 145 mmol/L    Potassium 3.4 (L) 3.5 - 5.5 mmol/L    Chloride 110 100 - 111 mmol/L    CO2 24 21 - 32 mmol/L    Anion gap 6 3.0 - 18 mmol/L    Glucose 102 (H) 74 - 99 mg/dL    BUN 17 7.0 - 18 MG/DL    Creatinine 1.00 0.6 - 1.3 MG/DL    BUN/Creatinine ratio 17 12 - 20      GFR est AA >60 >60 ml/min/1.73m2    GFR est non-AA >60 >60 ml/min/1.73m2    Calcium 9.7 8.5 - 10.1 MG/DL    Bilirubin, total 2.6 (H) 0.2 - 1.0 MG/DL    ALT (SGPT) 1,242 (H) 16 - 61 U/L    AST (SGOT) 1,263 (H) 10 - 38 U/L    Alk. phosphatase 182 (H) 45 - 117 U/L    Protein, total 6.3 (L) 6.4 - 8.2 g/dL    Albumin 3.0 (L) 3.4 - 5.0 g/dL    Globulin 3.3 2.0 - 4.0 g/dL    A-G Ratio 0.9 0.8 - 1.7     PROTHROMBIN TIME + INR    Collection Time: 06/18/22  4:48 AM   Result Value Ref Range    Prothrombin time 16.5 (H) 11.5 - 15.2 sec    INR 1.3 (H) 0.8 - 1.2         Radiologic Studies -   No orders to display         Medical Decision Making   I am the first provider for this patient.     I reviewed the vital signs, available nursing notes, past medical history, past surgical history, family history and social history. Vital Signs-Reviewed the patient's vital signs. EKG:     Records Reviewed: Nursing Notes (Time of Review: 2:27 PM)    ED Course: Progress Notes, Reevaluation, and Consults:         Provider Notes (Medical Decision Making):   MDM  Number of Diagnoses or Management Options  Elevated LFTs  Diagnosis management comments: Patient is a 82-year-old male with a history of alcohol abuse, liver disease, and alcohol withdrawal presenting due to recent discharge from hospital with abnormal labs including elevated LFTs. Patient currently asymptomatic upon review of labs see that his creatinine trended down he no longer has DEEPTHI, LFTs still elevated but also trended down while hospital.  Since patient currently asymptomatic no longer in alcohol withdrawal with normalization of creatinine and decreasing LFTs believe that patient can be treated as an outpatient. Plan to discharge patient with follow-up with his primary care doctor and or gastroenterologist.      Procedures    Critical Care Time:       Diagnosis     Clinical Impression: No diagnosis found. Disposition: Home    Follow-up Information    None          Patient's Medications   Start Taking    No medications on file   Continue Taking    IBUPROFEN (MOTRIN) 600 MG TABLET    Take 1 Tab by mouth every six (6) hours as needed for Pain. ONDANSETRON (ZOFRAN ODT) 4 MG DISINTEGRATING TABLET    1 Tablet by SubLINGual route every eight (8) hours as needed for Nausea or Vomiting. These Medications have changed    No medications on file   Stop Taking    No medications on file     Disclaimer: Sections of this note are dictated using utilizing voice recognition software. Minor typographical errors may be present. If questions arise, please do not hesitate to contact me or call our department.

## 2022-06-18 NOTE — PROGRESS NOTES
[email or web address]  [phone]    GASTROENTEROLOGY Follow up progress note    Impression:   1. Transaminitis - Brief spike in LFTs while inpatient suggests shock liver/ interruption of blood flow as they are now quickly trending back down, but still remain in the 1000's. Preliminary results of doppler are negative for PVT. Baseline LFTs appear to be alcoholic pattern/alcoholic hepatitis. Urine drug screen and acetaminophen levels normal   - Acute hep panel neg, urine tox panel negative, but blood alcohol positive, HIV are pending   2. DEEPTHI - creat peaked at 2.9, down to 1.0 today. 3. Thrombocytopenia - platelets 28 today, appears to be chronic-to me this looks to be too low due to chronic liver disease alone-could be a component of alcohol toxicity, or bone marrow suppression from alcohol use/B12 deficiency. Would be prudent to get an opinion from hematology to rule out concomitant hematological issues. High chance of him having cirrhosis. US 2021 shows steatosis. Recommend repeat complete abdominal US to evaluate liver and also spleen size. 4. Hyperkalemia - improved  5. Alcohol abuse - CIWA protocol      Plan:     1. Await pending labs  2. Trend LFTs; Check B12/Folate levels. 3. Monitor for mental status changes and increased INR, if this occurs will need to transfer to tertiary center for possible transplant/higher level care. 4. Avoid hepatotoxic medications  5. Continue with CIWA protocol  6. EtOH cessation stressed  7. Consider hematology evaluation. 8. Given likelihood of cirrhosis he will need screening for varices, he is also due for initial screening colonoscopy. These can be done as OP unless other issues arise indicating sooner need for scopes. 9. Medical management per primary team-Discussed with nursing and with Dr. Lincoln Jennings continued hospitalization till we see further trending down of liver enzymes. Chief Complaint: transaminitis    Subjective:  States he wants to go home. Denies any bleeding. Does not appear to be encephalopathic. ROS: Denies any fevers, chills, rash. Eyes: conjunctiva normal, EOM normal   Neck: ROM normal, supple and trachea normal   Cardiovascular: heart normal, intact distal pulses, normal rate and regular rhythm   Pulmonary/Chest Wall: breath sounds normal and effort normal   Abdominal: appearance normal, bowel sounds normal and soft, non-acute, non-tender;  No clinical evidence of encephalopathy     Patient Active Problem List   Diagnosis Code    Alcohol withdrawal ([facility] [address] F10.239    DEEPTHI (acute kidney injury) ([facility] [address] N17.9    Alcohol abuse F10.10         Visit Vitals  BP (!) 145/96 (BP 1 Location: Left upper arm, BP Patient Position: Lying)   Pulse 73   Temp 98.4 °F (36.9 °C)   Resp 18   Ht 5' 10.87\" (1.8 m)   Wt 61.2 kg (135 lb)   SpO2 97%   BMI 18.90 kg/m²           Intake/Output Summary (Last 24 hours) at 6/18/2022 1036  Last data filed at 6/18/2022 0935  Gross per 24 hour   Intake 1440 ml   Output 3700 ml   Net -2260 ml       CBC w/Diff    Lab Results   Component Value Date/Time    WBC 4.1 (L) 06/18/2022 04:48 AM    RBC 3.21 (L) 06/18/2022 04:48 AM    HGB 10.6 (L) 06/18/2022 04:48 AM    HCT 28.4 (L) 06/18/2022 04:48 AM    MCV 88.5 06/18/2022 04:48 AM    MCH 33.0 06/18/2022 04:48 AM    MCHC 37.3 (H) 06/18/2022 04:48 AM    RDW 12.8 06/18/2022 04:48 AM    PLT 28 (LL) 06/18/2022 04:48 AM    Lab Results   Component Value Date/Time    GRANS 73 06/18/2022 04:48 AM    LYMPH 19 (L) 06/18/2022 04:48 AM    EOS 1 06/18/2022 04:48 AM    BANDS 2 06/18/2022 04:48 AM    BASOS 1 06/18/2022 04:48 AM      Basic Metabolic Profile   Recent Labs     06/18/22  0448      K 3.4*      CO2 24   BUN 17   CA 9.7        Hepatic Function    Lab Results   Component Value Date/Time    ALB 3.0 (L) 06/18/2022 04:48 AM    TP 6.3 (L) 06/18/2022 04:48 AM     (H) 06/18/2022 04:48 AM    No results found for: TBIL       Coags   Recent Labs     06/18/22  0448 06/17/22  1044 PTP 16.5* 17.4*   INR 1.3* 1.4*               Amanuel Hodge MD    [facility]. [email or web address]  Phone: [phone]  Pager: [phone] CONSTITUTIONAL: No fevers, chills, fatigue, dizziness, weakness  EYES: No double vision, blurry vision  ENT: No ear pain, nasal congestion, runny nose, sore throat  CV: No palpitations  PULM: No cough, shortness of breath  GI: No abdominal pain, nausea, vomiting, diarrhea  : No dysuria, polyuria, hematuria  SKIN: No rashes, swelling  MSK: No muscle aches  NEURO: + seizure. No headache, paresthesias

## 2022-06-18 NOTE — PROGRESS NOTES
Wound Prevention Checklist    Patient: Charline Quiñones (28 y.o. male)  Date: 6/18/2022  Diagnosis: Alcohol withdrawal ([facility] [address] [F10.239]  DEEPTHI (acute kidney injury) ([facility] [address] [N17.9]  Alcohol abuse [F10.10] <principal problem not specified>    Precautions:         []  Heel prevention boots placed on patient    []  Patient turned q2h during shift    []  Lift team ordered    []  Patient on Sam bed/Specialty bed    []  Each Wound is documented during shift (Stage, Color, drainage, odor, measurements, and dressings)    [x]  Dual skin checks done at bedside during shift report with Dada Lew RN    Pt is self care. Christiano Krueger      1020: pt urgent to leave because he says he has thinks to do. Spoke with pt bedside with Dr. Dereck Hyde and pt would like to leave AMA. 1028: pt signed AMA form, IVs removed, patient dressed self and taken off monitor.      1040: pt walked out [facility]

## 2022-06-18 NOTE — DISCHARGE INSTRUCTIONS
You presented to the emergency department after leaving the hospital AMA. Your liver function tests are still elevated.   You should follow-up with your primary care doctor and a gastroenterologist.

## 2022-06-20 LAB
MYOGLOBIN UR QL: POSITIVE
MYOGLOBIN UR-MCNC: 78 NG/ML (ref 0–13)

## 2022-06-30 LAB
7-AMINOCLONAZEPAM: NEGATIVE NG/ML
ALPRAZOLAM, 763914: NEGATIVE NG/ML
AMPHETAMINES SERPL QL SCN: NEGATIVE NG/ML
BARBITURATES SERPL QL SCN: NEGATIVE UG/ML
BENZODIAZ SERPL QL SCN: ABNORMAL NG/ML
BENZODIAZEPINES CONFIRM: POSITIVE
CANNABINOIDS SERPL QL SCN: NEGATIVE NG/ML
CHLORDIAZEPOXIDE, 716035: PRESENT NG/ML
CLONAZEPAM, 763916: NEGATIVE NG/ML
COCAINE+BZE SERPL QL SCN: NEGATIVE NG/ML
DESALKYLFLURAZEPAM, 767601: NEGATIVE NG/ML
DESMETHYLCHLORDIAZEPOXIDE, 810910: PRESENT NG/ML
DESMETHYLDIAZEPAM, RMBN16: NEGATIVE NG/ML
DIAZEPAM, 810905: NEGATIVE NG/ML
FLURAZEPAM, 790417: NEGATIVE NG/ML
LORAZEPAM, 763912: NEGATIVE NG/ML
MEPERIDINE, DR296L: NEGATIVE NG/ML
METHADONE SERPL QL SCN: NEGATIVE NG/ML
MIDAZOLAM, 763922: NEGATIVE NG/ML
OPIATES SERPL QL SCN: NEGATIVE NG/ML
OPIATES, 790423: NEGATIVE NG/ML
OXAZEPAM CONFIRM, 763908: NEGATIVE NG/ML
OXYCODONE, 790407: NEGATIVE NG/ML
PCP SERPL QL SCN: NEGATIVE NG/ML
PROPOXYPH SERPL QL SCN: NEGATIVE NG/ML
TEMAZEPAM, 763918: NEGATIVE NG/ML
TRAMADOL, DR297L: NEGATIVE NG/ML
TRIAZOLAM, 763920: NEGATIVE NG/ML